# Patient Record
Sex: MALE | Race: OTHER | HISPANIC OR LATINO | Employment: FULL TIME | URBAN - METROPOLITAN AREA
[De-identification: names, ages, dates, MRNs, and addresses within clinical notes are randomized per-mention and may not be internally consistent; named-entity substitution may affect disease eponyms.]

---

## 2017-09-19 ENCOUNTER — HOSPITAL ENCOUNTER (EMERGENCY)
Facility: HOSPITAL | Age: 42
Discharge: HOME | End: 2017-09-19
Attending: EMERGENCY MEDICINE

## 2017-09-19 VITALS
WEIGHT: 265 LBS | HEART RATE: 92 BPM | BODY MASS INDEX: 35.94 KG/M2 | RESPIRATION RATE: 20 BRPM | DIASTOLIC BLOOD PRESSURE: 87 MMHG | SYSTOLIC BLOOD PRESSURE: 149 MMHG | OXYGEN SATURATION: 97 % | TEMPERATURE: 96.8 F

## 2017-09-19 DIAGNOSIS — B34.9 VIRAL SYNDROME: Primary | ICD-10-CM

## 2017-09-19 PROCEDURE — 99283 EMERGENCY DEPT VISIT LOW MDM: CPT

## 2017-09-19 RX ORDER — FLUTICASONE PROPIONATE 50 MCG
1 SPRAY, SUSPENSION (ML) NASAL DAILY
Qty: 16 G | Refills: 0 | Status: SHIPPED | OUTPATIENT
Start: 2017-09-19

## 2019-01-14 ENCOUNTER — APPOINTMENT (EMERGENCY)
Dept: RADIOLOGY | Facility: HOSPITAL | Age: 44
End: 2019-01-14
Payer: COMMERCIAL

## 2019-01-14 ENCOUNTER — HOSPITAL ENCOUNTER (EMERGENCY)
Facility: HOSPITAL | Age: 44
Discharge: LEFT BEFORE DISCHARGE | End: 2019-01-14
Attending: EMERGENCY MEDICINE
Payer: COMMERCIAL

## 2019-01-14 VITALS
BODY MASS INDEX: 35.94 KG/M2 | DIASTOLIC BLOOD PRESSURE: 81 MMHG | SYSTOLIC BLOOD PRESSURE: 143 MMHG | HEART RATE: 86 BPM | WEIGHT: 265 LBS | TEMPERATURE: 98.4 F | RESPIRATION RATE: 18 BRPM | OXYGEN SATURATION: 97 %

## 2019-01-14 DIAGNOSIS — G89.29 CHRONIC BACK PAIN: ICD-10-CM

## 2019-01-14 DIAGNOSIS — M54.9 CHRONIC BACK PAIN: ICD-10-CM

## 2019-01-14 DIAGNOSIS — M54.50 LOWER BACK PAIN: Primary | ICD-10-CM

## 2019-01-14 PROCEDURE — 96372 THER/PROPH/DIAG INJ SC/IM: CPT

## 2019-01-14 PROCEDURE — 72148 MRI LUMBAR SPINE W/O DYE: CPT

## 2019-01-14 PROCEDURE — 99284 EMERGENCY DEPT VISIT MOD MDM: CPT

## 2019-01-14 RX ORDER — METHOCARBAMOL 500 MG/1
500 TABLET, FILM COATED ORAL ONCE
Status: COMPLETED | OUTPATIENT
Start: 2019-01-14 | End: 2019-01-14

## 2019-01-14 RX ORDER — ACETAMINOPHEN 325 MG/1
975 TABLET ORAL ONCE
Status: COMPLETED | OUTPATIENT
Start: 2019-01-14 | End: 2019-01-14

## 2019-01-14 RX ORDER — IBUPROFEN 400 MG/1
400 TABLET ORAL EVERY 6 HOURS PRN
Qty: 30 TABLET | Refills: 0 | Status: SHIPPED | OUTPATIENT
Start: 2019-01-14

## 2019-01-14 RX ORDER — KETOROLAC TROMETHAMINE 30 MG/ML
15 INJECTION, SOLUTION INTRAMUSCULAR; INTRAVENOUS ONCE
Status: COMPLETED | OUTPATIENT
Start: 2019-01-14 | End: 2019-01-14

## 2019-01-14 RX ORDER — LIDOCAINE 50 MG/G
1 PATCH TOPICAL ONCE
Status: DISCONTINUED | OUTPATIENT
Start: 2019-01-14 | End: 2019-01-15 | Stop reason: HOSPADM

## 2019-01-14 RX ADMIN — METHOCARBAMOL 500 MG: 500 TABLET, FILM COATED ORAL at 20:21

## 2019-01-14 RX ADMIN — LIDOCAINE 1 PATCH: 50 PATCH CUTANEOUS at 20:44

## 2019-01-14 RX ADMIN — KETOROLAC TROMETHAMINE 15 MG: 30 INJECTION, SOLUTION INTRAMUSCULAR at 20:21

## 2019-01-14 RX ADMIN — ACETAMINOPHEN 975 MG: 325 TABLET, FILM COATED ORAL at 20:21

## 2019-01-15 NOTE — ED ATTENDING ATTESTATION
Marilou Hobbs MD, saw and evaluated the patient  I have discussed the patient with the resident/non-physician practitioner and agree with the resident's/non-physician practitioner's findings, Plan of Care, and MDM as documented in the resident's/non-physician practitioner's note, except where noted  All available labs and Radiology studies were reviewed  At this point I agree with the current assessment done in the Emergency Department  I have conducted an independent evaluation of this patient a history and physical is as follows:    Pt has 7 month history of low back pain and Pt lifts tires at work Pain is intermittent   Pt states that fr the past 2 months has had difficulty gettign to bathroom and is unable to hold his urine no fecal co Pt denies weakness or numbness pt states pain radiates into l buttock PE: alert nad heart reg lungs clear abd soft nontender tender lumbar sacral area motor5/5 sensation wnl dtrs 2+ MDM: due to urinary symptoms will check mri    Critical Care Time  CritCare Time    Procedures

## 2019-01-15 NOTE — DISCHARGE INSTRUCTIONS
Follow up with primary care doctor in 3-5 days  If your symptoms continue, or if there are ANY concerns,     Back Pain   WHAT YOU NEED TO KNOW:   Back pain is common  It can be caused by many conditions, such as arthritis or the breakdown of spinal discs  Your risk for back pain is increased by injuries, lack of activity, or repeated bending and twisting  You may feel sore or stiff on one or both sides of your back  The pain may spread to your buttocks or thighs  DISCHARGE INSTRUCTIONS:   Medicines:   · NSAIDs  help decrease swelling and pain  This medicine is available with or without a doctor's order  NSAIDs can cause stomach bleeding or kidney problems in certain people  If you take blood thinner medicine, always ask your healthcare provider if NSAIDs are safe for you  Always read the medicine label and follow directions  · Acetaminophen  decreases pain  It is available without a doctor's order  Ask how much to take and how often to take it  Follow directions  Acetaminophen can cause liver damage if not taken correctly  · Prescription pain medicine  may be given  Ask your healthcare provider how to take this medicine safely  · Take your medicine as directed  Contact your healthcare provider if you think your medicine is not helping or if you have side effects  Tell him or her if you are allergic to any medicine  Keep a list of the medicines, vitamins, and herbs you take  Include the amounts, and when and why you take them  Bring the list or the pill bottles to follow-up visits  Carry your medicine list with you in case of an emergency  Follow up with your healthcare provider in 2 weeks, or as directed:  Write down your questions so you remember to ask them during your visits  How to manage your back pain:   · Apply ice  on your back or affected area for 15 to 20 minutes every hour or as directed  Use an ice pack, or put crushed ice in a plastic bag  Cover it with a towel   Ice helps prevent tissue damage and decreases pain  · Apply heat  on your back or affected area for 20 to 30 minutes every 2 hours for as many days as directed  Heat helps decrease pain and muscle spasms  · Stay active  as much as you can without causing more pain  Bed rest could make your back pain worse  Avoid heavy lifting until your pain is gone  Return to the emergency department if:   · You have pain, numbness, or weakness in one or both legs  · Your pain becomes so severe that you cannot walk  · You cannot control your urine or bowel movements  · You have severe back pain with chest pain  · You have severe back pain, nausea, and vomiting  · You have severe back pain that spreads to your side or genital area  Contact your healthcare provider if:   · You have back pain that does not get better with rest and pain medicine  · You have a fever  · You have pain that worsens when you are on your back or when you rest     · You have pain that worsens when you cough or sneeze  · You lose weight without trying  · You have questions or concerns about your condition or care  © 2017 [facility] Information is for End User's use only and may not be sold, redistributed or otherwise used for commercial purposes  All illustrations and images included in CareNotes® are the copyrighted property of A D A M [employer]  or Cedrick Bhatti  The above information is an  only  It is not intended as medical advice for individual conditions or treatments  Talk to your doctor, nurse or pharmacist before following any medical regimen to see if it is safe and effective for you

## 2019-01-15 NOTE — ED PROVIDER NOTES
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  Patient states that the pain is intermittent may minimally better with ibuprofen the denies any radiation to his lower extremities  Denies any numbness or tingling  Patient does state that for the past couple months he has been having incontinence of his urine  He states that sometimes he is unable to hold his urine prior to the urination  Patient denies  denies any bowel complaints  Denies any IV drug abuse or trauma  Denies any fevers or recent illness patient states that he is able to the feel his perineum when he wipes after defecation  Otherwise denies any chest pain shortness of breath nausea vomiting abdominal pain  Prior to Admission Medications   Prescriptions Last Dose Informant Patient Reported? Taking?   dextromethorphan-guaifenesin (MUCINEX DM)  MG per 12 hr tablet   No No   Sig: Take 1 tablet by mouth every 12 (twelve) hours   fluticasone (FLONASE) 50 mcg/act nasal spray   No No   Si spray into each nostril daily      Facility-Administered Medications: None       History reviewed  No pertinent past medical history  History reviewed  No pertinent surgical history  History reviewed  No pertinent family history  I have reviewed and agree with the history as documented      Social History   Substance Use Topics    Smoking status: Current Every Day Smoker     Types: Cigarettes    Smokeless tobacco: Never Used      Comment: on weekends    Alcohol use Yes      Comment: occasional        Review of Systems   Constitutional: Negative for appetite change, chills, diaphoresis, fatigue and fever  HENT: Negative for congestion, ear discharge, ear pain, hearing loss, postnasal drip, rhinorrhea, sneezing and sore throat  Eyes: Negative for pain, discharge and redness  Respiratory: Negative for cough, choking, chest tightness, shortness of breath, wheezing and stridor  Cardiovascular: Negative for chest pain and palpitations  Gastrointestinal: Negative for abdominal distention, abdominal pain, blood in stool, constipation, diarrhea, nausea and vomiting  Genitourinary: Positive for urgency  Negative for decreased urine volume, difficulty urinating, dysuria, flank pain, frequency and hematuria  Urinary incontinence   Musculoskeletal: Positive for back pain  Negative for arthralgias, gait problem, joint swelling, myalgias, neck pain and neck stiffness  Skin: Negative for color change, pallor and rash  Allergic/Immunologic: Negative for environmental allergies, food allergies and immunocompromised state  Neurological: Negative for dizziness, seizures, weakness, light-headedness, numbness and headaches  Hematological: Negative for adenopathy  Does not bruise/bleed easily  Psychiatric/Behavioral: Negative for agitation and behavioral problems  Physical Exam  ED Triage Vitals [01/14/19 1824]   Temperature Pulse Respirations Blood Pressure SpO2   98 4 °F (36 9 °C) 100 18 (!) 166/102 96 %      Temp Source Heart Rate Source Patient Position - Orthostatic VS BP Location FiO2 (%)   Oral Monitor Sitting Left arm --      Pain Score       5           Orthostatic Vital Signs  Vitals:    01/14/19 1824 01/14/19 2144   BP: (!) 166/102 143/81   Pulse: 100 86   Patient Position - Orthostatic VS: Sitting Sitting       Physical Exam   Constitutional: He is oriented to person, place, and time  He appears well-developed and well-nourished  HENT:   Head: Normocephalic and atraumatic     Nose: Nose normal    Mouth/Throat: Oropharynx is clear and moist  Eyes: Pupils are equal, round, and reactive to light  Conjunctivae and EOM are normal    Neck: Normal range of motion  Neck supple  Cardiovascular: Normal rate, regular rhythm and normal heart sounds  Exam reveals no gallop and no friction rub  No murmur heard  Pulmonary/Chest: Effort normal and breath sounds normal  No respiratory distress  He has no wheezes  He has no rales  Abdominal: Soft  Bowel sounds are normal  He exhibits no distension  There is no tenderness  There is no rebound and no guarding  Musculoskeletal: Normal range of motion  Neurological: He is alert and oriented to person, place, and time  He has normal strength  No cranial nerve deficit or sensory deficit  He displays a negative Romberg sign  Skin: Skin is warm and dry  Psychiatric: He has a normal mood and affect  His behavior is normal    Nursing note and vitals reviewed  ED Medications  Medications   ketorolac (TORADOL) injection 15 mg (15 mg Intramuscular Given 1/14/19 2021)   acetaminophen (TYLENOL) tablet 975 mg (975 mg Oral Given 1/14/19 2021)   methocarbamol (ROBAXIN) tablet 500 mg (500 mg Oral Given 1/14/19 2021)       Diagnostic Studies  Results Reviewed     None                 MRI lumbar spine wo contrast   Final Result by Rafael Viera MD (01/14 2156)      Degenerative changes as described above  Workstation performed: [id number]               Procedures  Procedures      Phone Consults  ED Phone Contact    ED Course  ED Course as of Tyson 15 2249   Mon Jan 14, 2019   2040 Patient refused digital rectal exam    2212 Patient refusing urine sample and states that he needs to go home to see his kids                                MDM  Number of Diagnoses or Management Options  Chronic back pain:   Lower back pain:   Diagnosis management comments: 21-year-old male presents emerged department for evaluation of back pain      MDM:  Patient showing red flags concerning for cauda equina syndrome due to his urinary incontinence  I will order MRI to evaluate for this will treat patient symptomatically of total Tylenol Robaxin and Lidoderm patch  I will then reassess patient  MRI was negative for cauda equina sydrome  Patient feeling moderate back pain relief  Patient refused to give urine  Signed out AMA and understood and verbalized understanding risks of signing out AMA including death and permanent disability  CritCare Time    Disposition  Final diagnoses:   Lower back pain   Chronic back pain     Time reflects when diagnosis was documented in both MDM as applicable and the Disposition within this note     Time User Action Codes Description Comment    1/14/2019 10:17 PM Gianni Nolberto Add [M54 5] Lower back pain     1/14/2019 10:19 PM Gianni Nolberto Add [M54 9,  G89 29] Chronic back pain       ED Disposition     ED Disposition Condition Comment    AMA  Landon Tejeda discharge to home/self care  Condition at discharge: Good        Follow-up Information     Follow up With Specialties Details [facility]  In 3 days  [address] [address] [phone]          Discharge Medication List as of 1/14/2019 10:24 PM      START taking these medications    Details   ibuprofen (MOTRIN) 400 mg tablet Take 1 tablet (400 mg total) by mouth every 6 (six) hours as needed for mild pain, Starting Mon 1/14/2019, Print         CONTINUE these medications which have NOT CHANGED    Details   dextromethorphan-guaifenesin (MUCINEX DM)  MG per 12 hr tablet Take 1 tablet by mouth every 12 (twelve) hours, Starting Tue 9/19/2017, Print      fluticasone (FLONASE) 50 mcg/act nasal spray 1 spray into each nostril daily, Starting Tue 9/19/2017, Print           No discharge procedures on file  ED Provider  Attending physically available and evaluated Landon Hoffmankar  I managed the patient along with the ED Attending      Electronically Signed by Segundo Stern MD  01/15/19 3221

## 2019-07-16 ENCOUNTER — HOSPITAL ENCOUNTER (EMERGENCY)
Facility: HOSPITAL | Age: 44
Discharge: HOME | End: 2019-07-16
Attending: EMERGENCY MEDICINE
Payer: COMMERCIAL

## 2019-07-16 VITALS
WEIGHT: 260 LBS | HEIGHT: 71 IN | OXYGEN SATURATION: 98 % | TEMPERATURE: 98 F | RESPIRATION RATE: 20 BRPM | BODY MASS INDEX: 36.4 KG/M2 | SYSTOLIC BLOOD PRESSURE: 152 MMHG | DIASTOLIC BLOOD PRESSURE: 84 MMHG | HEART RATE: 102 BPM

## 2019-07-16 DIAGNOSIS — M77.8 SUBSCAPULARIS TENDINITIS, LEFT: ICD-10-CM

## 2019-07-16 DIAGNOSIS — M75.92 LEFT SUPRASPINATUS TENDINITIS: Primary | ICD-10-CM

## 2019-07-16 PROCEDURE — 99282 EMERGENCY DEPT VISIT SF MDM: CPT | Performed by: EMERGENCY MEDICINE

## 2019-07-16 PROCEDURE — 99283 EMERGENCY DEPT VISIT LOW MDM: CPT

## 2019-07-16 RX ORDER — IBUPROFEN 400 MG/1
400 TABLET ORAL ONCE
Status: COMPLETED | OUTPATIENT
Start: 2019-07-16 | End: 2019-07-16

## 2019-07-16 RX ADMIN — IBUPROFEN 400 MG: 400 TABLET ORAL at 21:32

## 2019-07-17 NOTE — ED PROVIDER NOTES
ASSESSMENT AND PLAN    Lexii Baldwin is a 44 y o  male who presents for evaluation of left, nontraumatic shoulder pain, possibly a work related injury to the patient performing a lot of heavy lifting at work  On arrival, the patient is hemodynamically stable and well-appearing without acute distress, with a nontoxic appearance  On exam , the patient displays tenderness to palpation over the supraspinatus, and subscapularis, which is exacerbated with internal rotation and abduction  Range of motion is full, and strength does not appear to be affected   The physical exam is otherwise unremarkable   -likely subscapularis and supraspinatus tendonitis  The patient was offered an x-ray, however he declined  The patient will follow up with occupational health  He was also provided with orthopedic follow-up should his symptoms are improve  Was offered Toradol for symptomatic relief, however he declined, and instead opted for ibuprofen  Recommend ibuprofen as an outpatient for pain as needed  History  Chief Complaint   Patient presents with    Shoulder Pain     pt reports left shoulder pain x 2 weekdenies injury but states 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  pt denies numbnees, tingling, or radiating pain     This is a 79-year-old male who presents for evaluation of nontraumatic left shoulder pain  The patient does a lot of lifting at work, 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  He states that since he started working the, he has developed left shoulder pain  He describes the pain as anterior, sharp, and generally only present with certain movements, stating that specifically abduction causes a lot of discomfort  He has not tried taking any medications  He states that he is starting to feel similar pain on his right shoulder, so he came to the emergency department for further evaluation  He denies any trauma    He denies any chest pain, shortness of breath, abdominal pain, nausea, vomiting, or diarrhea  He has no fevers  The patient has no history of orthopedic surgery  Prior to Admission Medications   Prescriptions Last Dose Informant Patient Reported? Taking?   dextromethorphan-guaifenesin (MUCINEX DM)  MG per 12 hr tablet   No No   Sig: Take 1 tablet by mouth every 12 (twelve) hours   fluticasone (FLONASE) 50 mcg/act nasal spray   No No   Si spray into each nostril daily   ibuprofen (MOTRIN) 400 mg tablet   No No   Sig: Take 1 tablet (400 mg total) by mouth every 6 (six) hours as needed for mild pain      Facility-Administered Medications: None       No past medical history on file  No past surgical history on file  No family history on file  I have reviewed and agree with the history as documented  Social History     Tobacco Use    Smoking status: Current Every Day Smoker     Types: Cigarettes    Smokeless tobacco: Never Used    Tobacco comment: on weekends   Substance Use Topics    Alcohol use: Yes     Comment: occasional    Drug use: No        Review of Systems   Constitutional: Negative for chills and fever  HENT: Negative for congestion and sinus pain  Eyes: Negative for photophobia and visual disturbance  Respiratory: Negative for cough and shortness of breath  Cardiovascular: Negative for chest pain and palpitations  Gastrointestinal: Negative for abdominal pain, diarrhea, nausea and vomiting  Genitourinary: Negative for dysuria and hematuria  Musculoskeletal: Negative for neck pain and neck stiffness  Skin: Negative for pallor and rash  Neurological: Negative for light-headedness and headaches         Physical Exam  ED Triage Vitals [19]   Temperature Pulse Respirations Blood Pressure SpO2   98 °F (36 7 °C) 102 20 152/84 98 %      Temp src Heart Rate Source Patient Position - Orthostatic VS BP Location FiO2 (%)   -- Monitor -- -- --      Pain Score       5             Orthostatic Vital Signs  Vitals:    19 BP: 152/84   Pulse: 102       Physical Exam   Constitutional: He is oriented to person, place, and time  Awake alert, well-appearing, no acute distress  Nontoxic  HENT:   Head: Normocephalic  Mouth/Throat: Oropharynx is clear and moist  No oropharyngeal exudate  Eyes: Pupils are equal, round, and reactive to light  EOM are normal  No scleral icterus  Neck: Normal range of motion  No JVD present  Cardiovascular: Normal rate and regular rhythm  Pulmonary/Chest: Effort normal  No respiratory distress  Abdominal: Soft  He exhibits no distension  Musculoskeletal:   Left shoulder is atraumatic  There is tenderness to palpation over the anterior aspect of the shoulder joint  There are no overlying skin changes, deformity, or crepitus  Pain is reproducible with internal rotation, and abduction, however range of motion is full  Neurological: He is alert and oriented to person, place, and time  No cranial nerve deficit  He exhibits normal muscle tone  Skin: Skin is warm and dry  No pallor  ED Medications  Medications   ibuprofen (MOTRIN) tablet 400 mg (has no administration in time range)       Diagnostic Studies  Results Reviewed     None                 No orders to display         Procedures  Procedures        ED Course                               MDM    Disposition  Final diagnoses:   None     ED Disposition     None      Follow-up Information    None         Patient's Medications   Discharge Prescriptions    No medications on file     No discharge procedures on file  ED Provider  Attending physically available and evaluated Jhoan Garrett I managed the patient along with the ED Attending      Electronically Signed by         Valerie Goldberg MD  07/17/19 9156

## 2019-07-17 NOTE — ED ATTENDING ATTESTATION
Celio Warner MD, saw and evaluated the patient  I have discussed the patient with the resident/non-physician practitioner and agree with the resident's/non-physician practitioner's findings, Plan of Care, and MDM as documented in the resident's/non-physician practitioner's note, except where noted  All available labs and Radiology studies were reviewed  I was present for key portions of any procedure(s) performed by the resident/non-physician practitioner and I was immediately available to provide assistance  At this point I agree with the current assessment done in the Emergency Department  I have conducted an independent evaluation of this patient a history and physical is as follows:    43 y/o m c/o L shoulder pain that has been ongoing x 2 weeks  Pt denies injury however states he does heavy lifting for work  Pain is worse when he does cross body movements  He has not tried medications or rest  No neck pain  No numbness/weakness/tingling  When specifically asked about his R shoulder he denies any pain  NO n/v  No cp/sob  PE, well developed m in NAD, VSS, NC/AT, MMM, neck supple/FROM, - midline ttp over C, T or L spine, RR/-murmurs, lungs CTAB, abd soft, NT/D, +BS, -r/g, + 2 radial pulses, capillary refill  < 2 sec, intact , intact sensation throughout left arm, FROM wrist, elbow and shoulder, pain with palpation over anterior aspect of shoulder, no erythema/warmth, FROM however pain reproduced on touch and internal/external rotation  R shoulder wnl on exam  A/p L shoulder pain, concern for msk, discussed imaing with patient, pt declining imaging  Discussed nonnarcotic pain control and f/u for MRI, PT and ortho   Pt states he will do tjhis    Critical Care Time  Procedures

## 2019-11-19 ENCOUNTER — APPOINTMENT (EMERGENCY)
Dept: RADIOLOGY | Facility: HOSPITAL | Age: 44
End: 2019-11-19

## 2019-11-19 ENCOUNTER — HOSPITAL ENCOUNTER (EMERGENCY)
Facility: HOSPITAL | Age: 44
Discharge: HOME | End: 2019-11-19
Attending: EMERGENCY MEDICINE

## 2019-11-19 VITALS
OXYGEN SATURATION: 97 % | DIASTOLIC BLOOD PRESSURE: 96 MMHG | RESPIRATION RATE: 20 BRPM | WEIGHT: 280 LBS | SYSTOLIC BLOOD PRESSURE: 202 MMHG | HEART RATE: 102 BPM | TEMPERATURE: 97.8 F | BODY MASS INDEX: 39.05 KG/M2

## 2019-11-19 DIAGNOSIS — M25.512 ACUTE PAIN OF LEFT SHOULDER: ICD-10-CM
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
## 2019-11-19 DIAGNOSIS — M54.2 NECK PAIN: ICD-10-CM

## 2019-11-19 DIAGNOSIS — R93.89 ABNORMAL CT SCAN: ICD-10-CM

## 2019-11-19 PROCEDURE — 99284 EMERGENCY DEPT VISIT MOD MDM: CPT

## 2019-11-19 PROCEDURE — 99284 EMERGENCY DEPT VISIT MOD MDM: CPT | Performed by: EMERGENCY MEDICINE

## 2019-11-19 PROCEDURE — 73030 X-RAY EXAM OF SHOULDER: CPT

## 2019-11-19 PROCEDURE — 72125 CT NECK SPINE W/O DYE: CPT

## 2019-11-19 RX ORDER — KETOROLAC TROMETHAMINE 30 MG/ML
15 INJECTION, SOLUTION INTRAMUSCULAR; INTRAVENOUS ONCE
Status: DISCONTINUED | OUTPATIENT
Start: 2019-11-19 | End: 2019-11-19

## 2019-11-19 RX ORDER — IBUPROFEN 400 MG/1
400 TABLET ORAL ONCE
Status: COMPLETED | OUTPATIENT
Start: 2019-11-19 | End: 2019-11-19

## 2019-11-19 RX ORDER — ACETAMINOPHEN 325 MG/1
650 TABLET ORAL ONCE
Status: COMPLETED | OUTPATIENT
Start: 2019-11-19 | End: 2019-11-19

## 2019-11-19 RX ADMIN — IBUPROFEN 400 MG: 400 TABLET ORAL at 19:13

## 2019-11-19 RX ADMIN — ACETAMINOPHEN 650 MG: 325 TABLET ORAL at 19:13
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
## 2019-11-20 NOTE — DISCHARGE INSTRUCTIONS
Follow-up with a primary care doctor regarding your high blood pressure and review findings of today's scan which shows some spinal stenosis, which is a chronic condition that can sometimes cause neck and back pain

## 2019-12-13 ENCOUNTER — HOSPITAL ENCOUNTER (EMERGENCY)
Facility: HOSPITAL | Age: 44
Discharge: HOME | End: 2019-12-13
Attending: EMERGENCY MEDICINE | Admitting: EMERGENCY MEDICINE

## 2019-12-13 VITALS
TEMPERATURE: 98 F | SYSTOLIC BLOOD PRESSURE: 135 MMHG | OXYGEN SATURATION: 98 % | RESPIRATION RATE: 16 BRPM | BODY MASS INDEX: 36.96 KG/M2 | HEART RATE: 72 BPM | WEIGHT: 265 LBS | DIASTOLIC BLOOD PRESSURE: 75 MMHG

## 2019-12-13 DIAGNOSIS — R51.9 NONINTRACTABLE HEADACHE, UNSPECIFIED CHRONICITY PATTERN, UNSPECIFIED HEADACHE TYPE: Primary | ICD-10-CM

## 2019-12-13 LAB
ANION GAP SERPL CALCULATED.3IONS-SCNC: 4 MMOL/L (ref 4–13)
BUN SERPL-MCNC: 15 MG/DL (ref 5–25)
CALCIUM SERPL-MCNC: 8.8 MG/DL (ref 8.3–10.1)
CHLORIDE SERPL-SCNC: 109 MMOL/L (ref 100–108)
CO2 SERPL-SCNC: 25 MMOL/L (ref 21–32)
CREAT SERPL-MCNC: 0.89 MG/DL (ref 0.6–1.3)
EST. AVERAGE GLUCOSE BLD GHB EST-MCNC: 120 MG/DL
GFR SERPL CREATININE-BSD FRML MDRD: 105 ML/MIN/1.73SQ M
GLUCOSE SERPL-MCNC: 98 MG/DL (ref 65–140)
HBA1C MFR BLD: 5.8 % (ref 4.2–6.3)
POTASSIUM SERPL-SCNC: 4.2 MMOL/L (ref 3.5–5.3)
SODIUM SERPL-SCNC: 138 MMOL/L (ref 136–145)

## 2019-12-13 PROCEDURE — 99283 EMERGENCY DEPT VISIT LOW MDM: CPT

## 2019-12-13 PROCEDURE — 80048 BASIC METABOLIC PNL TOTAL CA: CPT | Performed by: ORTHOPAEDIC SURGERY

## 2019-12-13 PROCEDURE — 96361 HYDRATE IV INFUSION ADD-ON: CPT

## 2019-12-13 PROCEDURE — 83036 HEMOGLOBIN GLYCOSYLATED A1C: CPT | Performed by: ORTHOPAEDIC SURGERY

## 2019-12-13 PROCEDURE — 96374 THER/PROPH/DIAG INJ IV PUSH: CPT

## 2019-12-13 PROCEDURE — 36415 COLL VENOUS BLD VENIPUNCTURE: CPT | Performed by: ORTHOPAEDIC SURGERY

## 2019-12-13 PROCEDURE — 99284 EMERGENCY DEPT VISIT MOD MDM: CPT | Performed by: EMERGENCY MEDICINE

## 2019-12-13 RX ORDER — KETOROLAC TROMETHAMINE 30 MG/ML
15 INJECTION, SOLUTION INTRAMUSCULAR; INTRAVENOUS ONCE
Status: COMPLETED | OUTPATIENT
Start: 2019-12-13 | End: 2019-12-13

## 2019-12-13 RX ORDER — SODIUM CHLORIDE, SODIUM LACTATE, POTASSIUM CHLORIDE, CALCIUM CHLORIDE 600; 310; 30; 20 MG/100ML; MG/100ML; MG/100ML; MG/100ML
500 INJECTION, SOLUTION INTRAVENOUS ONCE
Status: COMPLETED | OUTPATIENT
Start: 2019-12-13 | End: 2019-12-13

## 2019-12-13 RX ADMIN — SODIUM CHLORIDE, SODIUM LACTATE, POTASSIUM CHLORIDE, AND CALCIUM CHLORIDE 500 ML: .6; .31; .03; .02 INJECTION, SOLUTION INTRAVENOUS at 09:12

## 2019-12-13 RX ADMIN — KETOROLAC TROMETHAMINE 15 MG: 30 INJECTION, SOLUTION INTRAMUSCULAR at 09:13

## 2019-12-13 NOTE — ED PROVIDER NOTES
History  Chief Complaint   Patient presents with    Headache     HA for 3 days  "My pressure may be up"  38year old male with no significant PMH who presents with complaint of headaches x 3 days  The patient states that he has headaches in the morning with blurred vision that resolve spontaneously  He states that his blurred vision is associated with taking off his glasses  He has had no fevers, chills, abdominal pain, cough or congestion  He has had no other hearing or visual changes  Patient states that he has noted dry mouth, increased thirst and increased urinary frequency  He is concerned that he may have diabetes as he has a family history of diabetes in his mother and maternal aunt  Patient quit drinking and cocaine use 2 months ago, but states that he smokes 1 pack per day  Prior to Admission Medications   Prescriptions Last Dose Informant Patient Reported? Taking?   dextromethorphan-guaifenesin (MUCINEX DM)  MG per 12 hr tablet   No No   Sig: Take 1 tablet by mouth every 12 (twelve) hours   fluticasone (FLONASE) 50 mcg/act nasal spray   No No   Si spray into each nostril daily   ibuprofen (MOTRIN) 400 mg tablet   No No   Sig: Take 1 tablet (400 mg total) by mouth every 6 (six) hours as needed for mild pain      Facility-Administered Medications: None       History reviewed  No pertinent past medical history  History reviewed  No pertinent surgical history  History reviewed  No pertinent family history  I have reviewed and agree with the history as documented  Social History     Tobacco Use    Smoking status: Current Every Day Smoker     Types: Cigarettes    Smokeless tobacco: Never Used    Tobacco comment: on weekends   Substance Use Topics    Alcohol use: Not Currently    Drug use: No        Review of Systems   Constitutional: Negative for chills, diaphoresis and fever     HENT: Negative for congestion, ear pain, hearing loss, rhinorrhea, sinus pain and sore throat  Eyes: Positive for visual disturbance  Negative for pain  Patient reports blurred vision     Respiratory: Negative for shortness of breath  Cardiovascular: Negative for chest pain  Gastrointestinal: Negative for abdominal distention, abdominal pain, constipation, diarrhea, nausea and vomiting  Endocrine: Positive for polydipsia and polyuria  Genitourinary: Positive for frequency  Negative for dysuria  Musculoskeletal: Negative for back pain and neck pain  Skin: Negative for rash  Neurological: Positive for headaches  Negative for weakness, light-headedness and numbness  Hematological: Negative for adenopathy  Psychiatric/Behavioral: Negative for confusion  Physical Exam  ED Triage Vitals   Temperature Pulse Respirations Blood Pressure SpO2   12/13/19 0828 12/13/19 0828 12/13/19 0828 12/13/19 0828 12/13/19 0828   98 °F (36 7 °C) 80 16 (!) 130/101 98 %      Temp Source Heart Rate Source Patient Position - Orthostatic VS BP Location FiO2 (%)   12/13/19 0828 12/13/19 0952 12/13/19 0952 12/13/19 0952 --   Oral Monitor Lying Right arm       Pain Score       12/13/19 0828       7             Orthostatic Vital Signs  Vitals:    12/13/19 0828 12/13/19 0952   BP: (!) 130/101 135/75   Pulse: 80 72   Patient Position - Orthostatic VS:  Lying       Physical Exam   Constitutional: He is oriented to person, place, and time  He appears well-nourished  No distress  Obese male in no acute distress     HENT:   Head: Normocephalic  Right Ear: External ear normal    Left Ear: External ear normal    Nose: Nose normal    Mouth/Throat: Oropharynx is clear and moist    Eyes: Pupils are equal, round, and reactive to light  Conjunctivae and EOM are normal    Neck: Normal range of motion  Neck supple  No thyromegaly present  Cardiovascular: Normal rate and intact distal pulses  Pulmonary/Chest: Effort normal    Abdominal: Soft  He exhibits no distension  There is no tenderness  Musculoskeletal: Normal range of motion  Lymphadenopathy:     He has no cervical adenopathy  Neurological: He is alert and oriented to person, place, and time  No sensory deficit  Skin: No rash noted  Psychiatric: He has a normal mood and affect  Nursing note and vitals reviewed  ED Medications  Medications   lactated ringers infusion 500 mL (0 mL Intravenous Stopped 12/13/19 0952)   ketorolac (TORADOL) injection 15 mg (15 mg Intravenous Given 12/13/19 0913)       Diagnostic Studies  Results Reviewed     Procedure Component Value Units Date/Time    Basic metabolic panel [[id number]]  (Abnormal) Collected:  12/13/19 0913    Lab Status:  Final result Specimen:  Blood from Arm, Left Updated:  12/13/19 0942     Sodium 138 mmol/L      Potassium 4 2 mmol/L      Chloride 109 mmol/L      CO2 25 mmol/L      ANION GAP 4 mmol/L      BUN 15 mg/dL      Creatinine 0 89 mg/dL      Glucose 98 mg/dL      Calcium 8 8 mg/dL      eGFR 105 ml/min/1 73sq m     Narrative:       [place] guidelines for Chronic Kidney Disease (CKD):     Stage 1 with normal or high GFR (GFR > 90 mL/min/1 73 square meters)    Stage 2 Mild CKD (GFR = 60-89 mL/min/1 73 square meters)    Stage 3A Moderate CKD (GFR = 45-59 mL/min/1 73 square meters)    Stage 3B Moderate CKD (GFR = 30-44 mL/min/1 73 square meters)    Stage 4 Severe CKD (GFR = 15-29 mL/min/1 73 square meters)    Stage 5 End Stage CKD (GFR <15 mL/min/1 73 square meters)  Note: GFR calculation is accurate only with a steady state creatinine    Hemoglobin A1C [[id number]] Collected:  12/13/19 0913    Lab Status:   In process Specimen:  Blood from Arm, Left Updated:  12/13/19 0921                 No orders to display         Procedures  Procedures      ED Course                               MDM  Number of Diagnoses or Management Options  Nonintractable headache, unspecified chronicity pattern, unspecified headache type:   Diagnosis management comments: 38 year old male with 3 days of headaches without alarm symptoms  Patient's headaches likely related to decreased PO fluid intake  Patient's BMP is within normal limits  The importance of establishing a PCP was discussed with the patient  Hgb A1C was drawn today with instructions given to follow up with the [facility] in 1 week  - Discharge home with outpatient follow up       Amount and/or Complexity of Data Reviewed  Clinical lab tests: ordered and reviewed    Risk of Complications, Morbidity, and/or Mortality  Presenting problems: minimal  Diagnostic procedures: minimal  Management options: minimal    Patient Progress  Patient progress: stable        Disposition  Final diagnoses:   Nonintractable headache, unspecified chronicity pattern, unspecified headache type     Time reflects when diagnosis was documented in both MDM as applicable and the Disposition within this note     Time User Action Codes Description Comment    12/13/2019  9:49 AM Michael Robison Add [R51] Nonintractable headache, unspecified chronicity pattern, unspecified headache type       ED Disposition     ED Disposition Condition Date/Time Comment    Discharge Stable Fri Dec 13, 2019  9:48 AM Amber Mathews discharge to home/self care  Follow-up Information     Follow up With Specialties Details Why [address] [facility]  In 1 week For establishing care and follow up for [facility] [phone]          Patient's Medications   Discharge Prescriptions    No medications on file     No discharge procedures on file  ED Provider  Attending physically available and evaluated Amber Mathews  I managed the patient along with the ED Attending      Electronically Signed by         Mary Beth Tom MD  12/13/19 4404

## 2019-12-13 NOTE — DISCHARGE INSTRUCTIONS
You may take tylenol or ibuprofen as needed for headaches   Please follow up with the [facility]

## 2019-12-30 ENCOUNTER — HOSPITAL ENCOUNTER (EMERGENCY)
Facility: HOSPITAL | Age: 44
Discharge: HOME | End: 2019-12-30
Attending: EMERGENCY MEDICINE

## 2019-12-30 VITALS
DIASTOLIC BLOOD PRESSURE: 83 MMHG | HEART RATE: 89 BPM | RESPIRATION RATE: 18 BRPM | WEIGHT: 275 LBS | TEMPERATURE: 98.3 F | OXYGEN SATURATION: 98 % | BODY MASS INDEX: 37.25 KG/M2 | HEIGHT: 72 IN | SYSTOLIC BLOOD PRESSURE: 137 MMHG

## 2019-12-30 DIAGNOSIS — J06.9 URI (UPPER RESPIRATORY INFECTION): Primary | ICD-10-CM

## 2019-12-30 DIAGNOSIS — J02.9 SORE THROAT: ICD-10-CM

## 2019-12-30 PROCEDURE — 99282 EMERGENCY DEPT VISIT SF MDM: CPT

## 2019-12-30 PROCEDURE — 99283 EMERGENCY DEPT VISIT LOW MDM: CPT | Performed by: EMERGENCY MEDICINE

## 2019-12-30 RX ORDER — IBUPROFEN 600 MG/1
600 TABLET ORAL EVERY 8 HOURS PRN
Qty: 30 TABLET | Refills: 0 | Status: SHIPPED | OUTPATIENT
Start: 2019-12-30

## 2019-12-30 RX ORDER — IBUPROFEN 600 MG/1
600 TABLET ORAL ONCE
Status: COMPLETED | OUTPATIENT
Start: 2019-12-30 | End: 2019-12-30

## 2019-12-30 RX ADMIN — IBUPROFEN 600 MG: 600 TABLET ORAL at 04:01

## 2019-12-30 NOTE — ED ATTENDING ATTESTATION
12/30/2019  Estrellita Canada DO, saw and evaluated the patient  I have discussed the patient with the resident/non-physician practitioner and agree with the resident's/non-physician practitioner's findings, Plan of Care, and MDM as documented in the resident's/non-physician practitioner's note, except where noted  All available labs and Radiology studies were reviewed  I was present for key portions of any procedure(s) performed by the resident/non-physician practitioner and I was immediately available to provide assistance  At this point I agree with the current assessment done in the Emergency Department  I have conducted an independent evaluation of this patient a history and physical is as follows:      39 yo male c/o 2h of sore throat, L ear pain  Constant since onset, no a/e factors  Non radiating  Denies fever, HA, neck pain/stiffness, CP/SOB, abd pain, n/v, rash  POP mild erythema, uvula midline  No tonsillar enlargement or exudates  Neck supple no meningismus  L TM unremarkable  Imp: sore throat, ear pain  Plan: symptomatic tx          ED Course         Critical Care Time  Procedures

## 2019-12-30 NOTE — ED PROVIDER NOTES
History  Chief Complaint   Patient presents with    Sore Throat     left ear ache, sore throat, started last night    Earache     39 yo M without relevant PMH presenting for left sided earache and sore throat for the past 2-3 hours with concern that he needs antibiotics for an ear infection  Hasn't had any fevers, chills, night sweats, CP, SOB, cough, GI symptoms, myalgias  Has been around his cousins who have had a URI  Hasn't noted any drainage from his ear, no foreign body reported  ROS otherwise neg as below  History provided by:  Patient   used: No        Prior to Admission Medications   Prescriptions Last Dose Informant Patient Reported? Taking?   dextromethorphan-guaifenesin (MUCINEX DM)  MG per 12 hr tablet   No Yes   Sig: Take 1 tablet by mouth every 12 (twelve) hours   fluticasone (FLONASE) 50 mcg/act nasal spray   No Yes   Si spray into each nostril daily   ibuprofen (MOTRIN) 400 mg tablet   No Yes   Sig: Take 1 tablet (400 mg total) by mouth every 6 (six) hours as needed for mild pain      Facility-Administered Medications: None       History reviewed  No pertinent past medical history  History reviewed  No pertinent surgical history  History reviewed  No pertinent family history  I have reviewed and agree with the history as documented  Social History     Tobacco Use    Smoking status: Current Every Day Smoker     Types: Cigarettes    Smokeless tobacco: Never Used    Tobacco comment: on weekends   Substance Use Topics    Alcohol use: Not Currently    Drug use: No        Review of Systems   Constitutional: Negative for appetite change, chills, diaphoresis, fatigue and fever  HENT: Positive for ear pain and sore throat  Negative for ear discharge, facial swelling, hearing loss, rhinorrhea, trouble swallowing and voice change  Eyes: Negative for photophobia, pain, redness and visual disturbance  Respiratory: Negative for cough and wheezing  Cardiovascular: Negative for chest pain  Gastrointestinal: Negative for abdominal pain, diarrhea, nausea and vomiting  Genitourinary: Negative for dysuria and hematuria  Musculoskeletal: Negative for arthralgias and myalgias  Skin: Negative for pallor and rash  Allergic/Immunologic: Negative for immunocompromised state  Neurological: Negative for dizziness, light-headedness and headaches  Psychiatric/Behavioral: Negative for confusion  The patient is not nervous/anxious  Physical Exam  ED Triage Vitals [12/30/19 0342]   Temperature Pulse Respirations Blood Pressure SpO2   98 3 °F (36 8 °C) 89 18 137/83 98 %      Temp src Heart Rate Source Patient Position - Orthostatic VS BP Location FiO2 (%)   -- -- -- -- --      Pain Score       7             Orthostatic Vital Signs  Vitals:    12/30/19 0342   BP: 137/83   Pulse: 89       Physical Exam   Constitutional: He appears well-developed and well-nourished  No distress  HENT:   Head: Normocephalic and atraumatic  Right Ear: Hearing, tympanic membrane and ear canal normal  No drainage, swelling or tenderness  No middle ear effusion  Left Ear: Hearing, tympanic membrane and ear canal normal  No drainage, swelling or tenderness  No middle ear effusion  Nose: Nose normal    Mouth/Throat: Uvula is midline and mucous membranes are normal  No oropharyngeal exudate  Mildly erythematous oropharynx without exudates  Eyes: Pupils are equal, round, and reactive to light  Conjunctivae are normal  Right eye exhibits no discharge  Left eye exhibits no discharge  Neck: Normal range of motion  Cardiovascular: Normal rate and regular rhythm  No murmur heard  Pulmonary/Chest: Effort normal and breath sounds normal  No respiratory distress  He has no wheezes  Abdominal: Soft  Bowel sounds are normal  He exhibits no distension  Musculoskeletal: Normal range of motion  He exhibits no edema or deformity  Neurological: He is alert     Skin: Skin is warm and dry  No rash noted  He is not diaphoretic  No pallor  Psychiatric: He has a normal mood and affect  Nursing note and vitals reviewed  ED Medications  Medications   ibuprofen (MOTRIN) tablet 600 mg (600 mg Oral Given 12/30/19 0401)       Diagnostic Studies  Results Reviewed     None                 No orders to display         Procedures  Procedures      ED Course                               MDM  Number of Diagnoses or Management Options  Sore throat:   URI (upper respiratory infection):   Diagnosis management comments: 39 yo M presenting for a few hours of sore throat and left ear pain without signs of infection at left ear, no mastoid TTP, to tenderness with manipulation of pinna  Oropharynx normal except for slight erythema  No indication for antibiotics at this time  Recommended supportive care with ibuprofen for pain  Discussed f/u with PCP and/or return to the ER if symptoms do not improve or start to significantly worsen within the next 48-72 hours  Discharged to home in good condition  Disposition  Final diagnoses:   URI (upper respiratory infection)   Sore throat     Time reflects when diagnosis was documented in both MDM as applicable and the Disposition within this note     Time User Action Codes Description Comment    12/30/2019  3:56 AM Ron Rosas Add [J06 9] URI (upper respiratory infection)     12/30/2019  3:56 AM Ron Rosas Add [J02 9] Sore throat       ED Disposition     ED Disposition Condition Date/Time Comment    Discharge Good Mon Dec 30, 2019  3:57 AM Cuco Ahr discharge to home/self care              Follow-up Information     Follow up With Specialties Details Why Contact Info Additional [address] Emergency Department Emergency Medicine Go to  As needed [address]  [phone]  ED, [address] [place] [address], [address] [place] [address] Internal Medicine Schedule an appointment as soon as possible for a visit in 1 day For reevaluation as we discussed  [id number] [facility] [id number] [address] [facility], [address] [place] [address], [id number] [phone]          Discharge Medication List as of 12/30/2019  4:00 AM      START taking these medications    Details   !! ibuprofen (MOTRIN) 600 mg tablet Take 1 tablet (600 mg total) by mouth every 8 (eight) hours as needed for mild pain or moderate pain, Starting Mon 12/30/2019, Print       !! - Potential duplicate medications found  Please discuss with provider  CONTINUE these medications which have NOT CHANGED    Details   dextromethorphan-guaifenesin (MUCINEX DM)  MG per 12 hr tablet Take 1 tablet by mouth every 12 (twelve) hours, Starting Tue 9/19/2017, Print      fluticasone (FLONASE) 50 mcg/act nasal spray 1 spray into each nostril daily, Starting Tue 9/19/2017, Print      !! ibuprofen (MOTRIN) 400 mg tablet Take 1 tablet (400 mg total) by mouth every 6 (six) hours as needed for mild pain, Starting Mon 1/14/2019, Print       !! - Potential duplicate medications found  Please discuss with provider  No discharge procedures on file  ED Provider  Attending physically available and evaluated Damon Kingsley  ILAN managed the patient along with the ED Attending      Electronically Signed by         Shania Ceballos MD  12/30/19 2020

## 2019-12-30 NOTE — DISCHARGE INSTRUCTIONS
Please follow up with our primary care clinic this week for repeat evaluation   You should use ibuprofen

## 2020-01-14 ENCOUNTER — APPOINTMENT (EMERGENCY)
Dept: RADIOLOGY | Facility: HOSPITAL | Age: 45
End: 2020-01-14
Payer: COMMERCIAL

## 2020-01-14 ENCOUNTER — HOSPITAL ENCOUNTER (EMERGENCY)
Facility: HOSPITAL | Age: 45
Discharge: HOME | End: 2020-01-14
Attending: EMERGENCY MEDICINE | Admitting: EMERGENCY MEDICINE
Payer: COMMERCIAL

## 2020-01-14 VITALS
SYSTOLIC BLOOD PRESSURE: 139 MMHG | TEMPERATURE: 98.7 F | BODY MASS INDEX: 38.51 KG/M2 | RESPIRATION RATE: 20 BRPM | HEART RATE: 107 BPM | DIASTOLIC BLOOD PRESSURE: 72 MMHG | WEIGHT: 280 LBS | OXYGEN SATURATION: 94 %

## 2020-01-14 DIAGNOSIS — J20.9 BRONCHITIS, ACUTE: Primary | ICD-10-CM

## 2020-01-14 PROCEDURE — 71046 X-RAY EXAM CHEST 2 VIEWS: CPT

## 2020-01-14 PROCEDURE — 99284 EMERGENCY DEPT VISIT MOD MDM: CPT | Performed by: EMERGENCY MEDICINE

## 2020-01-14 PROCEDURE — 99283 EMERGENCY DEPT VISIT LOW MDM: CPT

## 2020-01-14 RX ORDER — NICOTINE 21 MG/24HR
1 PATCH, TRANSDERMAL 24 HOURS TRANSDERMAL EVERY 24 HOURS
COMMUNITY
Start: 2019-12-17

## 2020-01-14 RX ORDER — OXYMETAZOLINE HYDROCHLORIDE 0.05 G/100ML
2 SPRAY NASAL ONCE
Status: COMPLETED | OUTPATIENT
Start: 2020-01-14 | End: 2020-01-14

## 2020-01-14 RX ORDER — OXYMETAZOLINE HYDROCHLORIDE 0.05 G/100ML
2 SPRAY NASAL 2 TIMES DAILY
Qty: 30 ML | Refills: 0 | Status: SHIPPED | OUTPATIENT
Start: 2020-01-14

## 2020-01-14 RX ORDER — AZITHROMYCIN 250 MG/1
TABLET, FILM COATED ORAL
Qty: 6 TABLET | Refills: 0 | Status: SHIPPED | OUTPATIENT
Start: 2020-01-14 | End: 2020-01-18

## 2020-01-14 RX ORDER — CYCLOBENZAPRINE HCL 10 MG
10 TABLET ORAL DAILY PRN
COMMUNITY
Start: 2019-12-17

## 2020-01-14 RX ADMIN — OXYMETAZOLINE HYDROCHLORIDE 2 SPRAY: 0.05 SPRAY NASAL at 20:10

## 2020-01-15 NOTE — ED NOTES
Pt states he is an unemployed   Pt denies recent trauma to chest   Pt denies any tylenol or motrin today       Dimitri Cobb RN  01/14/20 2006

## 2020-01-15 NOTE — ED PROVIDER NOTES
History  Chief Complaint   Patient presents with    Cough     per pt "he has been having some productive cough for about 2wks now with some grenish sputum  "     59-year-old male presents with chief complaint of nasal and sinus congestion coupled with a cough productive of a yellow greenish sputum  Patient reports onset was 2 weeks ago and it has just been lingering  No fevers or chills  No nausea or vomiting  No shortness of breath  Patient is a current everyday smoker  History provided by:  Patient   used: No    Cough   Cough characteristics:  Productive  Sputum characteristics:  Green and yellow  Severity:  Moderate  Onset quality:  Gradual  Duration:  2 weeks  Timing:  Constant  Progression:  Unchanged  Chronicity:  New  Smoker: yes    Context: upper respiratory infection    Relieved by:  Nothing  Worsened by:  Nothing  Ineffective treatments:  None tried  Associated symptoms: sinus congestion    Associated symptoms: no chest pain, no chills, no diaphoresis, no fever, no rash, no shortness of breath, no sore throat and no wheezing        Prior to Admission Medications   Prescriptions Last Dose Informant Patient Reported? Taking?    cyclobenzaprine (FLEXERIL) 10 mg tablet 2020 at Unknown time  Yes Yes   Sig: Take 10 mg by mouth daily as needed   dextromethorphan-guaifenesin (MUCINEX DM)  MG per 12 hr tablet Not Taking at Unknown time  No No   Sig: Take 1 tablet by mouth every 12 (twelve) hours   Patient not taking: Reported on 2020   fluticasone (FLONASE) 50 mcg/act nasal spray Not Taking at Unknown time  No No   Si spray into each nostril daily   Patient not taking: Reported on 2020   ibuprofen (MOTRIN) 400 mg tablet Not Taking at Unknown time  No No   Sig: Take 1 tablet (400 mg total) by mouth every 6 (six) hours as needed for mild pain   Patient not taking: Reported on 2020   ibuprofen (MOTRIN) 600 mg tablet Not Taking at Unknown time  No No   Sig: Take 1 tablet (600 mg total) by mouth every 8 (eight) hours as needed for mild pain or moderate pain   Patient not taking: Reported on 1/14/2020   nicotine (NICODERM CQ) 14 mg/24hr TD 24 hr patch 1/14/2020 at Unknown time  Yes Yes   Sig: Place 1 patch on the skin every 24 hours   nicotine polacrilex (NICORETTE) 4 mg gum 1/14/2020 at Unknown time  Yes Yes   Sig: Chew 4 mg 4 (four) times a day as needed      Facility-Administered Medications: None       History reviewed  No pertinent past medical history  History reviewed  No pertinent surgical history  History reviewed  No pertinent family history  I have reviewed and agree with the history as documented  Social History     Tobacco Use    Smoking status: Current Every Day Smoker     Types: Cigarettes    Smokeless tobacco: Never Used    Tobacco comment: on weekends   Substance Use Topics    Alcohol use: Not Currently    Drug use: No        Review of Systems   Constitutional: Negative for chills, diaphoresis and fever  HENT: Positive for sinus pressure and sinus pain  Negative for sore throat  Respiratory: Positive for cough  Negative for shortness of breath and wheezing  Cardiovascular: Negative for chest pain and palpitations  Gastrointestinal: Negative for diarrhea, nausea and vomiting  Genitourinary: Negative for dysuria and frequency  Skin: Negative for rash  All other systems reviewed and are negative  Physical Exam  Physical Exam   Constitutional: He is oriented to person, place, and time  He appears well-developed and well-nourished  He appears distressed (Mild)  HENT:   Head: Normocephalic and atraumatic  Eyes: Pupils are equal, round, and reactive to light  EOM are normal    Neck: Normal range of motion  No JVD present  Cardiovascular: Normal rate, regular rhythm, normal heart sounds and intact distal pulses  Exam reveals no gallop and no friction rub  No murmur heard    Pulmonary/Chest: Effort normal and breath sounds normal  No respiratory distress  He has no wheezes  He has no rales  He exhibits no tenderness  Musculoskeletal: Normal range of motion  He exhibits no tenderness  Neurological: He is alert and oriented to person, place, and time  Skin: Skin is warm and dry  Psychiatric: He has a normal mood and affect  His behavior is normal  Judgment and thought content normal    Nursing note and vitals reviewed  Vital Signs  ED Triage Vitals [01/14/20 1937]   Temperature Pulse Respirations Blood Pressure SpO2   98 7 °F (37 1 °C) (!) 107 20 139/72 94 %      Temp Source Heart Rate Source Patient Position - Orthostatic VS BP Location FiO2 (%)   Oral Monitor Sitting Left arm --      Pain Score       --           Vitals:    01/14/20 1937   BP: 139/72   Pulse: (!) 107   Patient Position - Orthostatic VS: Sitting         Visual Acuity      ED Medications  Medications   oxymetazoline (AFRIN) 0 05 % nasal spray 2 spray (has no administration in time range)       Diagnostic Studies  Results Reviewed     None                 XR chest 2 views   ED Interpretation by Axel Cartagena MD (01/14 1957)   This film was interpreted independently by me  No infiltrate, cardiac silhouette normal, no pleural effusions or pulmonary edema                    Procedures  Procedures         ED Course                               MDM  Number of Diagnoses or Management Options  Bronchitis, acute: new and requires workup  Diagnosis management comments: Background: 38 y o  male presents with cough, sinus congestion and pressure    Differential DX includes but is not limited to: bronchitis, sinusitis     Plan: chest xray, symptomatic treatment          Amount and/or Complexity of Data Reviewed  Tests in the radiology section of CPT®: ordered and reviewed    Patient Progress  Patient progress: stable        Disposition  Final diagnoses:   Bronchitis, acute     Time reflects when diagnosis was documented in both MDM as applicable and the Disposition within this note     Time User Action Codes Description Comment    1/14/2020  8:04 PM Rhina HAY Add [J41 1] Chronic bronchitis with productive mucopurulent cough ([address] )     1/14/2020  8:04 PM Rhina HAY Remove [J41 1] Chronic bronchitis with productive mucopurulent cough ([address] )     1/14/2020  8:04 PM Dwayne Songight Add [J20 9] Bronchitis, acute       ED Disposition     ED Disposition Condition Date/Time Comment    Discharge Stable Tue Jan 14, 2020  8:04 PM Abdi Carrasco discharge to home/self care  Follow-up Information    None         Patient's Medications   Discharge Prescriptions    AZITHROMYCIN (ZITHROMAX) 250 MG TABLET    Take first 2 tablets together on day one, then 1 tablet every day thereafter until finished  Start Date: 1/14/2020 End Date: 1/18/2020       Order Dose: --       Quantity: 6 tablet    Refills: 0    OXYMETAZOLINE (AFRIN) 0 05 % NASAL SPRAY    2 sprays by Each Nare route 2 (two) times a day For 3 days only to avoid rebound congestion  Start Date: 1/14/2020 End Date: --       Order Dose: 2 sprays       Quantity: 30 mL    Refills: 0     No discharge procedures on file      ED Provider  Electronically Signed by           Carolin Lujan MD  01/14/20 2005

## 2020-01-24 ENCOUNTER — HOSPITAL ENCOUNTER (EMERGENCY)
Facility: HOSPITAL | Age: 45
Discharge: HOME | End: 2020-01-24
Attending: EMERGENCY MEDICINE
Payer: COMMERCIAL

## 2020-01-24 VITALS
RESPIRATION RATE: 18 BRPM | OXYGEN SATURATION: 96 % | TEMPERATURE: 97.9 F | SYSTOLIC BLOOD PRESSURE: 150 MMHG | DIASTOLIC BLOOD PRESSURE: 95 MMHG | BODY MASS INDEX: 37.82 KG/M2 | HEART RATE: 77 BPM | WEIGHT: 275 LBS

## 2020-01-24 DIAGNOSIS — H92.02 LEFT EAR PAIN: Primary | ICD-10-CM

## 2020-01-24 PROCEDURE — 99284 EMERGENCY DEPT VISIT MOD MDM: CPT | Performed by: EMERGENCY MEDICINE

## 2020-01-24 PROCEDURE — 99283 EMERGENCY DEPT VISIT LOW MDM: CPT

## 2020-01-24 RX ORDER — IBUPROFEN 600 MG/1
600 TABLET ORAL ONCE
Status: COMPLETED | OUTPATIENT
Start: 2020-01-24 | End: 2020-01-24

## 2020-01-24 RX ORDER — LORATADINE 10 MG/1
10 TABLET ORAL DAILY
Qty: 20 TABLET | Refills: 0 | Status: SHIPPED | OUTPATIENT
Start: 2020-01-24

## 2020-01-24 RX ORDER — IBUPROFEN 600 MG/1
600 TABLET ORAL EVERY 6 HOURS PRN
Qty: 30 TABLET | Refills: 0 | Status: SHIPPED | OUTPATIENT
Start: 2020-01-24

## 2020-01-24 RX ADMIN — IBUPROFEN 600 MG: 600 TABLET ORAL at 03:56

## 2020-01-24 NOTE — ED PROVIDER NOTES
History  Chief Complaint   Patient presents with   [place]     left ear pain for 1 day, mild sore throat, no fever, no cough     HPI this is a 25-year-old male who presents to the emergency department with left ear pain  Patient woke up to use the restroom during the night, and noted that he had left ear pain  The patient did not take anything for the pain  He also reports a mild sore throat  Patient states that numerous people in the place where he is staying are sick  The patient denies fever, cough, chest pain, or shortness of breath  He does not have neck swelling  He does not have change in his hearing  Describes the pain as an aching sensation which has been constant since he noted it  Prior to Admission Medications   Prescriptions Last Dose Informant Patient Reported? Taking?    cyclobenzaprine (FLEXERIL) 10 mg tablet Not Taking at Unknown time  Yes No   Sig: Take 10 mg by mouth daily as needed   dextromethorphan-guaifenesin (MUCINEX DM)  MG per 12 hr tablet   No No   Sig: Take 1 tablet by mouth every 12 (twelve) hours   Patient not taking: Reported on 2020   fluticasone (FLONASE) 50 mcg/act nasal spray   No No   Si spray into each nostril daily   Patient not taking: Reported on 2020   ibuprofen (MOTRIN) 400 mg tablet   No No   Sig: Take 1 tablet (400 mg total) by mouth every 6 (six) hours as needed for mild pain   Patient not taking: Reported on 2020   ibuprofen (MOTRIN) 600 mg tablet   No No   Sig: Take 1 tablet (600 mg total) by mouth every 8 (eight) hours as needed for mild pain or moderate pain   Patient not taking: Reported on 2020   nicotine (NICODERM CQ) 14 mg/24hr TD 24 hr patch   Yes No   Sig: Place 1 patch on the skin every 24 hours   nicotine polacrilex (NICORETTE) 4 mg gum   Yes No   Sig: Chew 4 mg 4 (four) times a day as needed   oxymetazoline (AFRIN) 0 05 % nasal spray   No Yes   Si sprays by Each Nare route 2 (two) times a day For 3 days only to avoid rebound congestion  Facility-Administered Medications: None       History reviewed  No pertinent past medical history  History reviewed  No pertinent surgical history  History reviewed  No pertinent family history  I have reviewed and agree with the history as documented  Social History     Tobacco Use    Smoking status: Current Every Day Smoker     Types: Cigarettes    Smokeless tobacco: Never Used    Tobacco comment: on weekends   Substance Use Topics    Alcohol use: Not Currently    Drug use: No        Review of Systems  No chest pain, abdominal pain, nausea, vomiting, fevers, chills, shortness of breath  Review of systems negative in 12 systems reviewed  Physical Exam  Physical Exam    Vital Signs  ED Triage Vitals [01/24/20 0313]   Temperature Pulse Respirations Blood Pressure SpO2   97 9 °F (36 6 °C) 77 18 150/95 96 %      Temp Source Heart Rate Source Patient Position - Orthostatic VS BP Location FiO2 (%)   Tympanic Monitor Sitting Left arm --      Pain Score       5           Vitals:    01/24/20 0313   BP: 150/95   Pulse: 77   Patient Position - Orthostatic VS: Sitting       On exam the patient is awake, alert, interactive  Vital signs are normal   Pupils are round reactive to light  Extraocular movements are intact  His face is symmetric  His oropharynx is clear with slightly injected tonsils  Has no significant nasal congestion  TMs are normal bilaterally  He does not have significant impacted cerumen  His neck is supple and nontender  There is no adenopathy  He has full and normal jaw excursion  The patient's heart is regular without murmurs, rubs, gallops  Lungs are clear bilaterally with good air movement  Abdomen is soft and nontender with no masses, rebound, guarding  Extremities are warm well perfused  He has good pulses throughout    Patient is neurologically intact with normal back in skin exam   Visual Acuity      ED Medications  Medications - No data to display    Diagnostic Studies  Results Reviewed     None                 No orders to display              Procedures  Procedures         ED Course                               MDM  Impression:  Left ear pain, pharyngitis  Will plan to treat with Motrin and decongestants  Disposition  Final diagnoses:   None     ED Disposition     None      Follow-up Information    None         Patient's Medications   Discharge Prescriptions    No medications on file     No discharge procedures on file      ED Provider  Electronically Signed by           Tye Oconnor MD  01/24/20 4870

## 2020-05-26 ENCOUNTER — TRANSCRIBE ORDERS (OUTPATIENT)
Dept: ADMINISTRATIVE | Facility: HOSPITAL | Age: 45
End: 2020-05-26

## 2020-05-26 DIAGNOSIS — S46.012A STRAIN OF TENDON OF LEFT ROTATOR CUFF, INITIAL ENCOUNTER: ICD-10-CM

## 2020-05-26 DIAGNOSIS — R52 PAIN: Primary | ICD-10-CM

## 2020-06-12 ENCOUNTER — HOSPITAL ENCOUNTER (OUTPATIENT)
Dept: MRI IMAGING | Facility: HOSPITAL | Age: 45
Discharge: HOME | End: 2020-06-12
Payer: COMMERCIAL

## 2020-06-12 DIAGNOSIS — R52 PAIN: ICD-10-CM

## 2020-06-12 DIAGNOSIS — S46.012A STRAIN OF TENDON OF LEFT ROTATOR CUFF, INITIAL ENCOUNTER: ICD-10-CM

## 2020-06-12 PROCEDURE — 73221 MRI JOINT UPR EXTREM W/O DYE: CPT

## 2020-08-24 ENCOUNTER — HOSPITAL ENCOUNTER (EMERGENCY)
Facility: HOSPITAL | Age: 45
Discharge: HOME | End: 2020-08-24
Attending: EMERGENCY MEDICINE | Admitting: EMERGENCY MEDICINE
Payer: COMMERCIAL

## 2020-08-24 VITALS
TEMPERATURE: 98.2 F | WEIGHT: 278 LBS | SYSTOLIC BLOOD PRESSURE: 163 MMHG | RESPIRATION RATE: 20 BRPM | HEIGHT: 71 IN | OXYGEN SATURATION: 98 % | BODY MASS INDEX: 38.92 KG/M2 | HEART RATE: 81 BPM | DIASTOLIC BLOOD PRESSURE: 94 MMHG

## 2020-08-24 DIAGNOSIS — N48.89 PENILE IRRITATION: ICD-10-CM

## 2020-08-24 DIAGNOSIS — Z20.2 POSSIBLE EXPOSURE TO STD: Primary | ICD-10-CM

## 2020-08-24 LAB
BILIRUB UR QL STRIP: NEGATIVE
CLARITY UR: CLEAR
COLOR UR: YELLOW
COLOR, POC: NORMAL
GLUCOSE UR STRIP-MCNC: NEGATIVE MG/DL
HGB UR QL STRIP.AUTO: NEGATIVE
KETONES UR STRIP-MCNC: NEGATIVE MG/DL
LEUKOCYTE ESTERASE UR QL STRIP: NEGATIVE
NITRITE UR QL STRIP: NEGATIVE
PH UR STRIP.AUTO: 5.5 [PH] (ref 4.5–8)
PROT UR STRIP-MCNC: NEGATIVE MG/DL
SP GR UR STRIP.AUTO: >=1.03 (ref 1–1.03)
UROBILINOGEN UR QL STRIP.AUTO: 1 E.U./DL

## 2020-08-24 PROCEDURE — 99284 EMERGENCY DEPT VISIT MOD MDM: CPT | Performed by: PHYSICIAN ASSISTANT

## 2020-08-24 PROCEDURE — 81003 URINALYSIS AUTO W/O SCOPE: CPT

## 2020-08-24 PROCEDURE — 99283 EMERGENCY DEPT VISIT LOW MDM: CPT

## 2020-08-24 PROCEDURE — 87491 CHLMYD TRACH DNA AMP PROBE: CPT | Performed by: PHYSICIAN ASSISTANT

## 2020-08-24 PROCEDURE — 87591 N.GONORRHOEAE DNA AMP PROB: CPT | Performed by: PHYSICIAN ASSISTANT

## 2020-08-24 NOTE — ED PROVIDER NOTES
History  Chief Complaint   Patient presents with    Exposure to STD     Patient states that he had sex approx 2 weeks ago where the condom broke and states that the "vaginal was dry and it was rubbing"  Complaining of groin/penis pain  Denies any trouble urinating or foul smells  PATO MONROY  is a 41year old male presenting to the ED with concerns for an STI  Patient states he had sex almost 2 weeks ago and the condom broke  Patient states his partner was very dry and he kept going anyway despite the condom breaking  Patient has concern for STIs because he still continued having sex despite the condom not being intact  This caused friction and abrasions on the shaft of his penis just below the head  Patient states the area has been irritated and he has been vigorously scrubbing it with peroxide and with soap in the shower  Patient is concerned because it has been almost 2 weeks and the irritation has not yet gone away  He reports continued scrubbing of his penis because he is concerned for STIs  Patient denies fevers, chills, abdominal pain, nausea, vomiting, diarrhea, dysuria, hematuria, urgency, frequency, testicular pain and penile discharge  Prior to Admission Medications   Prescriptions Last Dose Informant Patient Reported? Taking?    cyclobenzaprine (FLEXERIL) 10 mg tablet Not Taking at Unknown time  Yes No   Sig: Take 10 mg by mouth daily as needed   dextromethorphan-guaifenesin (MUCINEX DM)  MG per 12 hr tablet   No No   Sig: Take 1 tablet by mouth every 12 (twelve) hours   Patient not taking: Reported on 2020   fluticasone (FLONASE) 50 mcg/act nasal spray   No No   Si spray into each nostril daily   Patient not taking: Reported on 2020   ibuprofen (MOTRIN) 400 mg tablet   No No   Sig: Take 1 tablet (400 mg total) by mouth every 6 (six) hours as needed for mild pain   Patient not taking: Reported on 2020   ibuprofen (MOTRIN) 600 mg tablet   No No   Sig: Take 1 tablet (600 mg total) by mouth every 8 (eight) hours as needed for mild pain or moderate pain   Patient not taking: Reported on 2020   ibuprofen (MOTRIN) 600 mg tablet Not Taking at Unknown time  No No   Sig: Take 1 tablet (600 mg total) by mouth every 6 (six) hours as needed for mild pain   Patient not taking: Reported on 2020   loratadine (CLARITIN) 10 mg tablet Not Taking at Unknown time  No No   Sig: Take 1 tablet (10 mg total) by mouth daily   Patient not taking: Reported on 2020   nicotine (NICODERM CQ) 14 mg/24hr TD 24 hr patch Not Taking at Unknown time  Yes No   Sig: Place 1 patch on the skin every 24 hours   nicotine polacrilex (NICORETTE) 4 mg gum Not Taking at Unknown time  Yes No   Sig: Chew 4 mg 4 (four) times a day as needed   oxymetazoline (AFRIN) 0 05 % nasal spray Not Taking at Unknown time  No No   Si sprays by Each Nare route 2 (two) times a day For 3 days only to avoid rebound congestion  Patient not taking: Reported on 2020      Facility-Administered Medications: None       History reviewed  No pertinent past medical history  History reviewed  No pertinent surgical history  History reviewed  No pertinent family history  I have reviewed and agree with the history as documented  E-Cigarette/Vaping    E-Cigarette Use Never User      E-Cigarette/Vaping Substances     Social History     Tobacco Use    Smoking status: Current Every Day Smoker     Types: Cigarettes    Smokeless tobacco: Never Used    Tobacco comment: on weekends   Substance Use Topics    Alcohol use: Not Currently    Drug use: Yes     Types: Marijuana       Review of Systems   Constitutional: Negative for chills and fever  HENT: Negative for congestion and sore throat  Eyes: Negative for photophobia and visual disturbance  Respiratory: Negative for cough and shortness of breath  Cardiovascular: Negative for chest pain and leg swelling     Gastrointestinal: Negative for abdominal pain, diarrhea, nausea and vomiting  Genitourinary: Positive for genital sores  Negative for difficulty urinating, discharge, dysuria, flank pain, frequency, hematuria, penile pain, penile swelling and testicular pain  Musculoskeletal: Negative for back pain, neck pain and neck stiffness  Skin: Negative for rash  Neurological: Negative for dizziness, light-headedness, numbness and headaches  Physical Exam  Physical Exam  Vitals signs and nursing note reviewed  Exam conducted with a chaperone present  Constitutional:       General: He is not in acute distress  Appearance: Normal appearance  He is obese  He is not ill-appearing, toxic-appearing or diaphoretic  Comments: Obese male sitting on chair in no acute distress  Appears anxious  HENT:      Head: Normocephalic and atraumatic  Right Ear: External ear normal       Left Ear: External ear normal    Eyes:      Extraocular Movements: Extraocular movements intact  Conjunctiva/sclera: Conjunctivae normal    Neck:      Musculoskeletal: Normal range of motion and neck supple  Cardiovascular:      Rate and Rhythm: Normal rate and regular rhythm  Pulses: Normal pulses  Heart sounds: Normal heart sounds  No murmur  No friction rub  No gallop  Pulmonary:      Effort: Pulmonary effort is normal       Breath sounds: Normal breath sounds  Abdominal:      General: Bowel sounds are normal       Palpations: Abdomen is soft  Tenderness: There is no abdominal tenderness  There is no right CVA tenderness or left CVA tenderness  Genitourinary:     Comments: Abrasions and erythema on shaft of penis just below the head  No vesicles, warts or other lesions noted  No penile discharge  No scrotal swelling  Musculoskeletal: Normal range of motion  Skin:     General: Skin is warm and dry  Capillary Refill: Capillary refill takes less than 2 seconds  Neurological:      General: No focal deficit present        Mental Status: He is alert and oriented to person, place, and time  Psychiatric:      Comments: Patient is anxious  Vital Signs  ED Triage Vitals [08/24/20 1306]   Temperature Pulse Respirations Blood Pressure SpO2   98 2 °F (36 8 °C) 81 20 163/94 98 %      Temp Source Heart Rate Source Patient Position - Orthostatic VS BP Location FiO2 (%)   Oral Monitor Sitting Left arm --      Pain Score       9           Vitals:    08/24/20 1306   BP: 163/94   Pulse: 81   Patient Position - Orthostatic VS: Sitting         Visual Acuity      ED Medications  Medications - No data to display    Diagnostic Studies  Results Reviewed     Procedure Component Value Units Date/Time    Chlamydia/GC amplified DNA by PCR [[id number]]  (Normal) Collected:  08/24/20 1337    Lab Status:  Final result Specimen:  Urine, Other Updated:  08/25/20 0552     N gonorrhoeae, DNA Probe Negative     Chlamydia trachomatis, DNA Probe Negative    Narrative:       Test performed using PCR amplification of target DNA  This test is intended as an aid in the diagnosis of Chlamydial and gonococcal disease  This test has not been evaluated in patients younger than 15years of age and is not recommended for evaluation of suspected sexual abuse  Additional testing is recommended when the results do not correlate with clinical signs and symptoms        Urine Macroscopic, POC [[id number]] Collected:  08/24/20 1339    Lab Status:  Final result Specimen:  Urine Updated:  08/24/20 1341     Color, UA Yellow     Clarity, UA Clear     pH, UA 5 5     Leukocytes, UA Negative     Nitrite, UA Negative     Protein, UA Negative mg/dl      Glucose, UA Negative mg/dl      Ketones, UA Negative mg/dl      Urobilinogen, UA 1 0 E U /dl      Bilirubin, UA Negative     Blood, UA Negative     Specific Gravity, UA >=1 030    Narrative:       CLINITEK RESULT    POCT urinalysis dipstick [[id number]]  (Normal) Resulted:  08/24/20 1336    Lab Status:  Final result Specimen:  Urine Updated:  08/24/20 1337 Color, UA   No orders to display              Procedures  Procedures         ED Course       US AUDIT      Most Recent Value   Initial Alcohol Screen: US AUDIT-C    1  How often do you have a drink containing alcohol?  0 Filed at: 08/24/2020 1332   2  How many drinks containing alcohol do you have on a typical day you are drinking? 0 Filed at: 08/24/2020 1332   3a  Male UNDER 65: How often do you have five or more drinks on one occasion? 0 Filed at: 08/24/2020 1332   3b  FEMALE Any Age, or MALE 65+: How often do you have 4 or more drinks on one occassion? 0 Filed at: 08/24/2020 1332   Audit-C Score  0 Filed at: 08/24/2020 1332                  JUSTIN/DAST-10      Most Recent Value   How many times in the past year have you    Used an illegal drug or used a prescription medication for non-medical reasons? Weekly Filed at: 08/24/2020 1332                                MDM  Number of Diagnoses or Management Options  Penile irritation:   Possible exposure to STD:   Diagnosis management comments: Normal urinalysis  GC/chlamydia test pending  Instructed patient to stop applying peroxide to and vigorously scrubbing his penis  Patient is causing further irritation by doing this  He may apply Neosporin to the area as needed  Instructed patient to follow-up with his PCP this week  Information for Urology also provided if needed  Patient reassured and grateful  The management plan was discussed in detail with the patient at bedside and all questions were answered  Prior to discharge, he was provided both verbal and written instructions  Discussed signs and symptoms for which to return to the emergency department  All questions were answered and patient was comfortable with the plan of care and discharged to home  Instructed the patient to follow up with his PCP as provided in his written instructions   Patient verbalized understanding of our discussion and plan of care, and agrees to return to the Emergency Department for concerns and progression of illness  Disposition  Final diagnoses:   Possible exposure to STD   Penile irritation     Time reflects when diagnosis was documented in both MDM as applicable and the Disposition within this note     Time User Action Codes Description Comment    8/24/2020  1:41 PM Higinio Mei [Z20 2] Possible exposure to STD     8/24/2020  1:46  E Demetrice Bolaños 1213 [D69 14] Penile irritation       ED Disposition     ED Disposition Condition Date/Time Comment    Discharge Stable Mon Aug 24, 2020  1:41 PM Lesly Ro discharge to home/self care              Follow-up Information     Follow up With Specialties Details Why Contact Info Additional [address] [facility], DO Family Medicine In 1 week  [address] [address]  [phone]       [facility]  As needed, If symptoms worsen [address]  [place] [address] [facility] [id number]  [address] [place] [address] For Urology [address], [address] [facility]          Discharge Medication List as of 8/24/2020  1:46 PM      CONTINUE these medications which have NOT CHANGED    Details   cyclobenzaprine (FLEXERIL) 10 mg tablet Take 10 mg by mouth daily as needed, Starting Tue 12/17/2019, Historical Med      dextromethorphan-guaifenesin ([place] [address] DM)  MG per 12 hr tablet Take 1 tablet by mouth every 12 (twelve) hours, Starting Tue 9/19/2017, Print      fluticasone (FLONASE) 50 mcg/act nasal spray 1 spray into each nostril daily, Starting Tue 9/19/2017, Print      !! ibuprofen (MOTRIN) 400 mg tablet Take 1 tablet (400 mg total) by mouth every 6 (six) hours as needed for mild pain, Starting Mon 1/14/2019, Print      !! ibuprofen (MOTRIN) 600 mg tablet Take 1 tablet (600 mg total) by mouth every 8 (eight) hours as needed for mild pain or moderate pain, Starting Mon 12/30/2019, Print      !! ibuprofen (MOTRIN) 600 mg tablet Take 1 tablet (600 mg total) by mouth every 6 (six) hours as needed for mild pain, Starting Fri 1/24/2020, Print      loratadine (CLARITIN) 10 mg tablet Take 1 tablet (10 mg total) by mouth daily, Starting Fri 1/24/2020, Print      nicotine (NICODERM CQ) 14 mg/24hr TD 24 hr patch Place 1 patch on the skin every 24 hours, Starting Tue 12/17/2019, Historical Med      nicotine polacrilex (NICORETTE) 4 mg gum Chew 4 mg 4 (four) times a day as needed, Starting Tue 12/17/2019, Historical Med      oxymetazoline (AFRIN) 0 05 % nasal spray 2 sprays by Each Nare route 2 (two) times a day For 3 days only to avoid rebound congestion  , Starting Tue 1/14/2020, Print       !! - Potential duplicate medications found  Please discuss with provider  No discharge procedures on file      PDMP Review     None          ED Provider  Electronically Signed by           Liseth Stewart PA-C  08/27/20 013

## 2020-08-25 LAB
C TRACH DNA SPEC QL NAA+PROBE: NEGATIVE
N GONORRHOEA DNA SPEC QL NAA+PROBE: NEGATIVE

## 2021-03-07 ENCOUNTER — HOSPITAL ENCOUNTER (EMERGENCY)
Facility: HOSPITAL | Age: 46
Discharge: HOME | End: 2021-03-07
Attending: EMERGENCY MEDICINE | Admitting: EMERGENCY MEDICINE
Payer: COMMERCIAL

## 2021-03-07 VITALS
RESPIRATION RATE: 18 BRPM | DIASTOLIC BLOOD PRESSURE: 80 MMHG | TEMPERATURE: 97.6 F | SYSTOLIC BLOOD PRESSURE: 127 MMHG | WEIGHT: 280 LBS | HEART RATE: 89 BPM | HEIGHT: 72 IN | OXYGEN SATURATION: 95 % | BODY MASS INDEX: 37.93 KG/M2

## 2021-03-07 DIAGNOSIS — L03.90 CELLULITIS: Primary | ICD-10-CM

## 2021-03-07 PROCEDURE — 99283 EMERGENCY DEPT VISIT LOW MDM: CPT

## 2021-03-07 PROCEDURE — 99284 EMERGENCY DEPT VISIT MOD MDM: CPT | Performed by: PHYSICIAN ASSISTANT

## 2021-03-07 RX ORDER — SULFAMETHOXAZOLE AND TRIMETHOPRIM 800; 160 MG/1; MG/1
1 TABLET ORAL 2 TIMES DAILY
Qty: 10 TABLET | Refills: 0 | Status: SHIPPED | OUTPATIENT
Start: 2021-03-07 | End: 2021-03-12

## 2021-03-07 RX ORDER — CEPHALEXIN 500 MG/1
500 CAPSULE ORAL EVERY 12 HOURS SCHEDULED
Qty: 10 CAPSULE | Refills: 0 | Status: SHIPPED | OUTPATIENT
Start: 2021-03-07 | End: 2021-03-12

## 2021-03-07 NOTE — ED PROVIDER NOTES
History  Chief Complaint   Patient presents with    Thumb Swelling     RIGHT THUMB PAINA ND SWELLING  PT REPORTS WASHING HANDS OFTEN AND HAVING DRY CRACKED HANDS  PURULENT DRAINAGE FROM CUTICLE  STATES THIS STARTED 5 DAYS AGO     Patient presents emergency room with a 1 week history of pain in his right thumb  He states it has been red and has actively been draining  He states is not getting any better  He complains of pain at the site  He denies any fever chills  He denies any nausea or vomiting or diarrhea  Denies any specific joint pain or decreased range of motion of his thumb  He denies any numbness, tingling, weakness in his right hand  History provided by:  Patient  Rash  Location: right thumb  Quality: draining, painful and redness    Pain details:     Quality:  Aching    Severity:  Mild    Onset quality:  Gradual    Duration:  1 week    Timing:  Intermittent    Progression:  Waxing and waning  Severity:  Mild  Duration:  1 week  Progression:  Worsening  Chronicity:  New  Relieved by:  Nothing  Worsened by:  Contact  Ineffective treatments:  None tried  Associated symptoms: no fever and no joint pain        Prior to Admission Medications   Prescriptions Last Dose Informant Patient Reported? Taking?    cyclobenzaprine (FLEXERIL) 10 mg tablet Not Taking at Unknown time  Yes No   Sig: Take 10 mg by mouth daily as needed   dextromethorphan-guaifenesin (MUCINEX DM)  MG per 12 hr tablet   No No   Sig: Take 1 tablet by mouth every 12 (twelve) hours   Patient not taking: Reported on 2020   fluticasone (FLONASE) 50 mcg/act nasal spray   No No   Si spray into each nostril daily   Patient not taking: Reported on 2020   ibuprofen (MOTRIN) 400 mg tablet   No No   Sig: Take 1 tablet (400 mg total) by mouth every 6 (six) hours as needed for mild pain   Patient not taking: Reported on 2020   ibuprofen (MOTRIN) 600 mg tablet   No No   Sig: Take 1 tablet (600 mg total) by mouth every 8 (eight) hours as needed for mild pain or moderate pain   Patient not taking: Reported on 2020   ibuprofen (MOTRIN) 600 mg tablet   No No   Sig: Take 1 tablet (600 mg total) by mouth every 6 (six) hours as needed for mild pain   Patient not taking: Reported on 2020   loratadine (CLARITIN) 10 mg tablet   No No   Sig: Take 1 tablet (10 mg total) by mouth daily   Patient not taking: Reported on 2020   nicotine (NICODERM CQ) 14 mg/24hr TD 24 hr patch   Yes No   Sig: Place 1 patch on the skin every 24 hours   nicotine polacrilex (NICORETTE) 4 mg gum   Yes No   Sig: Chew 4 mg 4 (four) times a day as needed   oxymetazoline (AFRIN) 0 05 % nasal spray   No No   Si sprays by Each Nare route 2 (two) times a day For 3 days only to avoid rebound congestion  Patient not taking: Reported on 2020      Facility-Administered Medications: None       History reviewed  No pertinent past medical history  History reviewed  No pertinent surgical history  History reviewed  No pertinent family history  I have reviewed and agree with the history as documented  E-Cigarette/Vaping    E-Cigarette Use Never User      E-Cigarette/Vaping Substances     Social History     Tobacco Use    Smoking status: Current Every Day Smoker     Types: Cigarettes    Smokeless tobacco: Never Used    Tobacco comment: on weekends   Substance Use Topics    Alcohol use: Not Currently    Drug use: Yes     Types: Marijuana       Review of Systems   Constitutional: Negative for fever  Musculoskeletal: Negative for arthralgias  Skin: Positive for color change and rash  All other systems reviewed and are negative  Physical Exam  Physical Exam  Vitals signs and nursing note reviewed  Constitutional:       Appearance: Normal appearance  He is obese  Eyes:      General:         Right eye: No discharge  Left eye: No discharge        Conjunctiva/sclera: Conjunctivae normal    Pulmonary:      Effort: Pulmonary effort is normal    Musculoskeletal: Normal range of motion  Skin:     Capillary Refill: Capillary refill takes less than 2 seconds  Comments: Inspection of the right thumb-this is a atraumatic upon inspection  There is remnants of previous serous drainage present  There is mild erythema along the paronychia but no fluctuance  There is nothing to be drained at this point  Capillary refills less than 2 seconds  There is good range of motion of the thumb in all planes  Neurological:      Mental Status: He is alert  Psychiatric:         Mood and Affect: Mood normal          Behavior: Behavior normal          Thought Content: Thought content normal          Judgment: Judgment normal          Vital Signs  ED Triage Vitals [03/07/21 0939]   Temperature Pulse Respirations Blood Pressure SpO2   97 6 °F (36 4 °C) 89 18 127/80 95 %      Temp Source Heart Rate Source Patient Position - Orthostatic VS BP Location FiO2 (%)   Oral Monitor -- -- --      Pain Score       --           Vitals:    03/07/21 0939   BP: 127/80   Pulse: 89         Visual Acuity      ED Medications  Medications - No data to display    Diagnostic Studies  Results Reviewed     None                 No orders to display              Procedures  Procedures         ED Course                                           MDM  Number of Diagnoses or Management Options  Cellulitis: new and does not require workup  Risk of Complications, Morbidity, and/or Mortality  Presenting problems: moderate  Diagnostic procedures: moderate  Management options: moderate  General comments: Patient presented to the emergency room with complaints of pain and redness and drainage from the dorsal aspect of his right thumb  He describes of paronychia that open and drain spontaneously  He describes of residual redness  He was seen and evaluated  He was diagnosed with localized cellulitis  There is no evidence that the patient needed to have an I and D today    He is placed on Keflex and Bactrim twice daily for the next 5 days  He will soak his thumb in warm water for 20 minutes 3 to 4 times a day  He was given reasons to return to the emergency room should his symptoms worsen  Patient Progress  Patient progress: stable      Disposition  Final diagnoses:   Cellulitis - Thumb  Time reflects when diagnosis was documented in both MDM as applicable and the Disposition within this note     Time User Action Codes Description Comment    3/7/2021 10:39 AM Suzan Bailey Add [L03 90] Cellulitis     3/7/2021 10:39 AM Anne Marie Lukas Ahsan Linsey Modify [L03 90] Cellulitis Thumb  ED Disposition     ED Disposition Condition Date/Time Comment    Discharge Stable Sun Mar 7, 2021 10:41 AM [place] discharge to home/self care              Follow-up Information     Follow up With Specialties Details Why Contact Info Additional [address] Emergency Department Emergency Medicine  As needed, If symptoms worsen [address] Emergency Department, [address], [place], [place], [address]          Discharge Medication List as of 3/7/2021 10:42 AM      START taking these medications    Details   cephalexin (KEFLEX) 500 mg capsule Take 1 capsule (500 mg total) by mouth every 12 (twelve) hours for 5 days, Starting Sun 3/7/2021, Until Fri 3/12/2021, Normal      sulfamethoxazole-trimethoprim (BACTRIM DS) 800-160 mg per tablet Take 1 tablet by mouth 2 (two) times a day for 5 days smx-tmp DS (BACTRIM) 800-160 mg tabs (1tab q12 D10), Starting Sun 3/7/2021, Until Fri 3/12/2021, Normal         CONTINUE these medications which have NOT CHANGED    Details   cyclobenzaprine (FLEXERIL) 10 mg tablet Take 10 mg by mouth daily as needed, Starting Tue 12/17/2019, Historical Med      dextromethorphan-guaifenesin (MUCINEX DM)  MG per 12 hr tablet Take 1 tablet by mouth every 12 (twelve) hours, Starting Tue 9/19/2017, Print      fluticasone (FLONASE) 50 mcg/act nasal spray 1 spray into each nostril daily, Starting Tue 9/19/2017, Print      !! ibuprofen (MOTRIN) 400 mg tablet Take 1 tablet (400 mg total) by mouth every 6 (six) hours as needed for mild pain, Starting Mon 1/14/2019, Print      !! ibuprofen (MOTRIN) 600 mg tablet Take 1 tablet (600 mg total) by mouth every 8 (eight) hours as needed for mild pain or moderate pain, Starting Mon 12/30/2019, Print      !! ibuprofen (MOTRIN) 600 mg tablet Take 1 tablet (600 mg total) by mouth every 6 (six) hours as needed for mild pain, Starting Fri 1/24/2020, Print      loratadine (CLARITIN) 10 mg tablet Take 1 tablet (10 mg total) by mouth daily, Starting Fri 1/24/2020, Print      nicotine (NICODERM CQ) 14 mg/24hr TD 24 hr patch Place 1 patch on the skin every 24 hours, Starting Tue 12/17/2019, Historical Med      nicotine polacrilex (NICORETTE) 4 mg gum Chew 4 mg 4 (four) times a day as needed, Starting Tue 12/17/2019, Historical Med      oxymetazoline (AFRIN) 0 05 % nasal spray 2 sprays by Each Nare route 2 (two) times a day For 3 days only to avoid rebound congestion  , Starting Tue 1/14/2020, Print       !! - Potential duplicate medications found  Please discuss with provider  No discharge procedures on file      PDMP Review     None          ED Provider  Electronically Signed by           Temo Fletcher PA-C  03/07/21 3672

## 2021-03-07 NOTE — DISCHARGE INSTRUCTIONS
Warm soaks for 20 minutes 3 4 times a day  Please take the antibiotics as instructed    If her symptoms worsen, return to the emergency room for repeat exam

## 2022-01-13 ENCOUNTER — HOSPITAL ENCOUNTER (EMERGENCY)
Facility: HOSPITAL | Age: 47
Discharge: HOME | End: 2022-01-13
Attending: EMERGENCY MEDICINE
Payer: COMMERCIAL

## 2022-01-13 ENCOUNTER — APPOINTMENT (EMERGENCY)
Dept: CT IMAGING | Facility: HOSPITAL | Age: 47
End: 2022-01-13
Payer: COMMERCIAL

## 2022-01-13 VITALS
TEMPERATURE: 98.7 F | OXYGEN SATURATION: 98 % | DIASTOLIC BLOOD PRESSURE: 94 MMHG | HEIGHT: 72 IN | SYSTOLIC BLOOD PRESSURE: 141 MMHG | HEART RATE: 91 BPM | RESPIRATION RATE: 16 BRPM | BODY MASS INDEX: 37.93 KG/M2 | WEIGHT: 280 LBS

## 2022-01-13 DIAGNOSIS — S16.1XXA STRAIN OF NECK MUSCLE, INITIAL ENCOUNTER: ICD-10-CM
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
## 2022-01-13 DIAGNOSIS — S00.83XA CONTUSION OF FACE, INITIAL ENCOUNTER: ICD-10-CM

## 2022-01-13 DIAGNOSIS — S09.90XA INJURY OF HEAD, INITIAL ENCOUNTER: ICD-10-CM

## 2022-01-13 PROCEDURE — 99284 EMERGENCY DEPT VISIT MOD MDM: CPT | Performed by: EMERGENCY MEDICINE

## 2022-01-13 PROCEDURE — 99284 EMERGENCY DEPT VISIT MOD MDM: CPT

## 2022-01-13 PROCEDURE — 70450 CT HEAD/BRAIN W/O DYE: CPT

## 2022-01-13 PROCEDURE — 72125 CT NECK SPINE W/O DYE: CPT

## 2022-01-13 PROCEDURE — G1004 CDSM NDSC: HCPCS

## 2022-01-13 RX ORDER — ACETAMINOPHEN 325 MG/1
650 TABLET ORAL ONCE
Status: COMPLETED | OUTPATIENT
Start: 2022-01-13 | End: 2022-01-13

## 2022-01-13 RX ADMIN — ACETAMINOPHEN 650 MG: 325 TABLET, FILM COATED ORAL at 17:39
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
## 2023-10-31 ENCOUNTER — HOSPITAL ENCOUNTER (EMERGENCY)
Facility: HOSPITAL | Age: 48
Discharge: HOME | End: 2023-11-01
Attending: EMERGENCY MEDICINE
Payer: COMMERCIAL

## 2023-10-31 VITALS
DIASTOLIC BLOOD PRESSURE: 85 MMHG | SYSTOLIC BLOOD PRESSURE: 166 MMHG | RESPIRATION RATE: 18 BRPM | OXYGEN SATURATION: 99 % | HEART RATE: 88 BPM | TEMPERATURE: 98.1 F

## 2023-10-31 DIAGNOSIS — R07.89 CHEST WALL PAIN: Primary | ICD-10-CM

## 2023-10-31 PROCEDURE — 99285 EMERGENCY DEPT VISIT HI MDM: CPT

## 2023-10-31 PROCEDURE — 93005 ELECTROCARDIOGRAM TRACING: CPT

## 2023-10-31 NOTE — Clinical Note
Gabe Moralez was seen and treated in our emergency department on 10/31/2023. Diagnosis: Chest pain    Meir Espinal  may return to work on return date. He may return on this date: 11/02/2023         If you have any questions or concerns, please don't hesitate to call.       Alie Conn, DO    ______________________________           _______________          _______________  Hospital Representative                              Date                                Time

## 2023-11-01 ENCOUNTER — APPOINTMENT (EMERGENCY)
Dept: RADIOLOGY | Facility: HOSPITAL | Age: 48
End: 2023-11-01
Payer: COMMERCIAL

## 2023-11-01 LAB
ALBUMIN SERPL BCP-MCNC: 4.4 G/DL (ref 3.5–5)
ALP SERPL-CCNC: 70 U/L (ref 34–104)
ALT SERPL W P-5'-P-CCNC: 20 U/L (ref 7–52)
ANION GAP SERPL CALCULATED.3IONS-SCNC: 6 MMOL/L
AST SERPL W P-5'-P-CCNC: 17 U/L (ref 13–39)
ATRIAL RATE: 78 BPM
BASOPHILS # BLD AUTO: 0.07 THOUSANDS/ÂΜL (ref 0–0.1)
BASOPHILS NFR BLD AUTO: 1 % (ref 0–1)
BILIRUB SERPL-MCNC: 0.4 MG/DL (ref 0.2–1)
BUN SERPL-MCNC: 16 MG/DL (ref 5–25)
CALCIUM SERPL-MCNC: 9.4 MG/DL (ref 8.4–10.2)
CARDIAC TROPONIN I PNL SERPL HS: 4 NG/L
CHLORIDE SERPL-SCNC: 100 MMOL/L (ref 96–108)
CO2 SERPL-SCNC: 28 MMOL/L (ref 21–32)
CREAT SERPL-MCNC: 0.86 MG/DL (ref 0.6–1.3)
EOSINOPHIL # BLD AUTO: 0.32 THOUSAND/ÂΜL (ref 0–0.61)
EOSINOPHIL NFR BLD AUTO: 3 % (ref 0–6)
ERYTHROCYTE [DISTWIDTH] IN BLOOD BY AUTOMATED COUNT: 15.1 % (ref 11.6–15.1)
GFR SERPL CREATININE-BSD FRML MDRD: 103 ML/MIN/1.73SQ M
GLUCOSE SERPL-MCNC: 97 MG/DL (ref 65–140)
HCT VFR BLD AUTO: 50.7 % (ref 36.5–49.3)
HGB BLD-MCNC: 15.6 G/DL (ref 12–17)
IMM GRANULOCYTES # BLD AUTO: 0.12 THOUSAND/UL (ref 0–0.2)
IMM GRANULOCYTES NFR BLD AUTO: 1 % (ref 0–2)
LYMPHOCYTES # BLD AUTO: 3.44 THOUSANDS/ÂΜL (ref 0.6–4.47)
LYMPHOCYTES NFR BLD AUTO: 27 % (ref 14–44)
MCH RBC QN AUTO: 22.8 PG (ref 26.8–34.3)
MCHC RBC AUTO-ENTMCNC: 30.8 G/DL (ref 31.4–37.4)
MCV RBC AUTO: 74 FL (ref 82–98)
MONOCYTES # BLD AUTO: 0.98 THOUSAND/ÂΜL (ref 0.17–1.22)
MONOCYTES NFR BLD AUTO: 8 % (ref 4–12)
NEUTROPHILS # BLD AUTO: 7.74 THOUSANDS/ÂΜL (ref 1.85–7.62)
NEUTS SEG NFR BLD AUTO: 60 % (ref 43–75)
NRBC BLD AUTO-RTO: 0 /100 WBCS
P AXIS: 68 DEGREES
PLATELET # BLD AUTO: 333 THOUSANDS/UL (ref 149–390)
PMV BLD AUTO: 11.2 FL (ref 8.9–12.7)
POTASSIUM SERPL-SCNC: 4.2 MMOL/L (ref 3.5–5.3)
PR INTERVAL: 150 MS
PROT SERPL-MCNC: 7.9 G/DL (ref 6.4–8.4)
QRS AXIS: 53 DEGREES
QRSD INTERVAL: 90 MS
QT INTERVAL: 348 MS
QTC INTERVAL: 396 MS
RBC # BLD AUTO: 6.84 MILLION/UL (ref 3.88–5.62)
SODIUM SERPL-SCNC: 134 MMOL/L (ref 135–147)
T WAVE AXIS: 28 DEGREES
VENTRICULAR RATE: 78 BPM
WBC # BLD AUTO: 12.67 THOUSAND/UL (ref 4.31–10.16)

## 2023-11-01 PROCEDURE — 99285 EMERGENCY DEPT VISIT HI MDM: CPT | Performed by: EMERGENCY MEDICINE

## 2023-11-01 PROCEDURE — 84484 ASSAY OF TROPONIN QUANT: CPT

## 2023-11-01 PROCEDURE — 71046 X-RAY EXAM CHEST 2 VIEWS: CPT

## 2023-11-01 PROCEDURE — 85025 COMPLETE CBC W/AUTO DIFF WBC: CPT

## 2023-11-01 PROCEDURE — 36415 COLL VENOUS BLD VENIPUNCTURE: CPT

## 2023-11-01 PROCEDURE — 80053 COMPREHEN METABOLIC PANEL: CPT

## 2023-11-01 PROCEDURE — 96374 THER/PROPH/DIAG INJ IV PUSH: CPT

## 2023-11-01 PROCEDURE — 93010 ELECTROCARDIOGRAM REPORT: CPT | Performed by: INTERNAL MEDICINE

## 2023-11-01 RX ORDER — KETOROLAC TROMETHAMINE 30 MG/ML
15 INJECTION, SOLUTION INTRAMUSCULAR; INTRAVENOUS ONCE
Status: COMPLETED | OUTPATIENT
Start: 2023-11-01 | End: 2023-11-01

## 2023-11-01 RX ORDER — IBUPROFEN 600 MG/1
600 TABLET ORAL EVERY 6 HOURS PRN
Qty: 30 TABLET | Refills: 0 | Status: SHIPPED | OUTPATIENT
Start: 2023-11-01

## 2023-11-01 RX ADMIN — KETOROLAC TROMETHAMINE 15 MG: 30 INJECTION, SOLUTION INTRAMUSCULAR; INTRAVENOUS at 00:26

## 2023-11-01 NOTE — DISCHARGE INSTRUCTIONS
You were seen in the emergency department for chest pain. Your work-up did not show any concerning findings. Your symptoms are most likely due to musculoskeletal pain. A prescription for ibuprofen was sent to your pharmacy. This will help with inflammation. Call the attached number to schedule a follow-up appointment with primary care. If you have worsening symptoms please return to the emergency department.

## 2023-11-01 NOTE — ED ATTENDING ATTESTATION
10/31/2023  IFroilan MD, saw and evaluated the patient. I have discussed the patient with the resident/non-physician practitioner and agree with the resident's/non-physician practitioner's findings, Plan of Care, and MDM as documented in the resident's/non-physician practitioner's note, except where noted. All available labs and Radiology studies were reviewed. I was present for key portions of any procedure(s) performed by the resident/non-physician practitioner and I was immediately available to provide assistance. At this point I agree with the current assessment done in the Emergency Department. I have conducted an independent evaluation of this patient a history and physical is as follows:    53 y.o. male presenting with right sided chest pain that started yesterday, worsened today. Right sided lateral thorax. This has been associated with a dry, nonproductive cough over the past 2 days. No injury/trauma. No cough. No fevers. No shortness of breath. No abdominal pain, nausea or vomiting. No history of DVT or PE. No recent prolonged immobilization. Patient smokes several cigarettes per week socially. ED Triage Vitals   Temperature Pulse Respirations Blood Pressure SpO2   10/31/23 2345 10/31/23 2344 10/31/23 2344 10/31/23 2344 10/31/23 2344   98.1 °F (36.7 °C) 88 18 166/85 99 %      Temp Source Heart Rate Source Patient Position - Orthostatic VS BP Location FiO2 (%)   10/31/23 2345 -- -- -- --   Oral          Pain Score       11/01/23 0026       10 - Worst Possible Pain           Vital signs and nursing notes reviewed    CONSTITUTIONAL: male appearing stated age resting in bed, in no acute distress  HEENT: atraumatic, normocephalic. Sclera anicteric, conjunctiva are not injected. Moist oral mucosa  CARDIOVASCULAR/CHEST: RRR, no M/R/G. 2+ radial pulses  PULMONARY: Breathing comfortably on RA. Breath sounds are equal and clear to auscultation  ABDOMEN: non-distended.    MSK: moves all extremities, no deformities, no peripheral edema, no calf asymmetry  NEURO: Awake, alert, and oriented x 3. Face symmetric. Moves all extremities spontaneously. No focal neurologic deficits  SKIN: Warm, appears well-perfused  MENTAL STATUS: Normal affect    Labs Reviewed   CBC AND DIFFERENTIAL - Abnormal       Result Value Ref Range Status    WBC 12.67 (*) 4.31 - 10.16 Thousand/uL Final    RBC 6.84 (*) 3.88 - 5.62 Million/uL Final    Hemoglobin 15.6  12.0 - 17.0 g/dL Final    Hematocrit 50.7 (*) 36.5 - 49.3 % Final    MCV 74 (*) 82 - 98 fL Final    MCH 22.8 (*) 26.8 - 34.3 pg Final    MCHC 30.8 (*) 31.4 - 37.4 g/dL Final    RDW 15.1  11.6 - 15.1 % Final    MPV 11.2  8.9 - 12.7 fL Final    Platelets 075  998 - 390 Thousands/uL Final    nRBC 0  /100 WBCs Final    Neutrophils Relative 60  43 - 75 % Final    Immat GRANS % 1  0 - 2 % Final    Lymphocytes Relative 27  14 - 44 % Final    Monocytes Relative 8  4 - 12 % Final    Eosinophils Relative 3  0 - 6 % Final    Basophils Relative 1  0 - 1 % Final    Neutrophils Absolute 7.74 (*) 1.85 - 7.62 Thousands/µL Final    Immature Grans Absolute 0.12  0.00 - 0.20 Thousand/uL Final    Lymphocytes Absolute 3.44  0.60 - 4.47 Thousands/µL Final    Monocytes Absolute 0.98  0.17 - 1.22 Thousand/µL Final    Eosinophils Absolute 0.32  0.00 - 0.61 Thousand/µL Final    Basophils Absolute 0.07  0.00 - 0.10 Thousands/µL Final   COMPREHENSIVE METABOLIC PANEL - Abnormal    Sodium 134 (*) 135 - 147 mmol/L Final    Potassium 4.2  3.5 - 5.3 mmol/L Final    Chloride 100  96 - 108 mmol/L Final    CO2 28  21 - 32 mmol/L Final    ANION GAP 6  mmol/L Final    BUN 16  5 - 25 mg/dL Final    Creatinine 0.86  0.60 - 1.30 mg/dL Final    Comment: Standardized to IDMS reference method    Glucose 97  65 - 140 mg/dL Final    Comment: If the patient is fasting, the ADA then defines impaired fasting glucose as > 100 mg/dL and diabetes as > or equal to 123 mg/dL.     Calcium 9.4  8.4 - 10.2 mg/dL Final    AST 17  13 - 39 U/L Final    ALT 20  7 - 52 U/L Final    Comment: Specimen collection should occur prior to Sulfasalazine administration due to the potential for falsely depressed results. Alkaline Phosphatase 70  34 - 104 U/L Final    Total Protein 7.9  6.4 - 8.4 g/dL Final    Albumin 4.4  3.5 - 5.0 g/dL Final    Total Bilirubin 0.40  0.20 - 1.00 mg/dL Final    Comment: Use of this assay is not recommended for patients undergoing treatment with eltrombopag due to the potential for falsely elevated results. N-acetyl-p-benzoquinone imine (metabolite of Acetaminophen) will generate erroneously low results in samples for patients that have taken an overdose of Acetaminophen. eGFR 103  ml/min/1.73sq m Final    Narrative:     [place] guidelines for Chronic Kidney Disease (CKD):     Stage 1 with normal or high GFR (GFR > 90 mL/min/1.73 square meters)    Stage 2 Mild CKD (GFR = 60-89 mL/min/1.73 square meters)    Stage 3A Moderate CKD (GFR = 45-59 mL/min/1.73 square meters)    Stage 3B Moderate CKD (GFR = 30-44 mL/min/1.73 square meters)    Stage 4 Severe CKD (GFR = 15-29 mL/min/1.73 square meters)    Stage 5 End Stage CKD (GFR <15 mL/min/1.73 square meters)  Note: GFR calculation is accurate only with a steady state creatinine   HS TROPONIN I 0HR - Normal    hs TnI 0hr 4  "Refer to ACS Flowchart"- see link ng/L Final    Comment:                                              Initial (time 0) result  If >=50 ng/L, Myocardial injury suggested ;  Type of myocardial injury and treatment strategy  to be determined. If 5-49 ng/L, a delta result at 2 hours and or 4 hours will be needed to further evaluate. If <4 ng/L, and chest pain has been >3 hours since onset, patient may qualify for discharge based on the HEART score in the ED. If <5 ng/L and <3hours since onset of chest pain, a delta result at 2 hours will be needed to further evaluate.     HS Troponin 99th Percentile URL of a Health Population=12 ng/L with a 95% Confidence Interval of 8-18 ng/L. Second Troponin (time 2 hours)  If calculated delta >= 20 ng/L,  Myocardial injury suggested ; Type of myocardial injury and treatment strategy to be determined. If 5-49 ng/L and the calculated delta is 5-19 ng/L, consult medical service for evaluation. Continue evaluation for ischemia on ecg and other possible etiology and repeat hs troponin at 4 hours. If delta is <5 ng/L at 2 hours, consider discharge based on risk stratification via the HEART score (if in ED), or LINDA risk score in IP/Observation. HS Troponin 99th Percentile URL of a Health Population=12 ng/L with a 95% Confidence Interval of 8-18 ng/L.     XR chest 2 views   Final Result      No acute cardiopulmonary disease. Findings are stable            Workstation performed: [id number]           HEART Risk Score      Flowsheet Row Most Recent Value   Heart Score Risk Calculator    History 0 Filed at: 11/01/2023 0100   ECG 0 Filed at: 11/01/2023 0100   Age 1 Filed at: 11/01/2023 0100   Risk Factors 1 Filed at: 11/01/2023 0100   Troponin 0 Filed at: 11/01/2023 0100   HEART Score 2 Filed at: 11/01/2023 0100            ED Course     Medications   ketorolac (TORADOL) injection 15 mg (15 mg Intravenous Given 11/1/23 0026)     53 y.o. male presenting with chest pain. Vital Signs reviewed. Differential diagnosis includes acute coronary syndrome, pericarditis, myocarditis, PE, pneumonia, pneumothorax, dissection, pleurisy, musculoskeletal pain, versus another etiology of symptoms. History, physical exam and data collected so far do not support ACS, PE, pneumonia. Suspect musculoskeletal pain, possibly due to recent new onset of cough. Recommend NSAIDs. No wheezing at present and symptoms have only been present for 2 days, will hold off on steroids at present, though patient may progress to develop bronchitis at which time we recommend reassessment.     Procedure  ECG 12 Lead Documentation Only    Date/Time: 11/1/2023 12:22 AM    Performed by: Mary Vallejo MD  Authorized by: Mary Vallejo MD    Comments:      Normal sinus rhythm, ventricular rate 78, MD interval 150, QRS 90, QTc 396, normal axis, no ST/T wave changes to suggest ischemia, no STEMI. No significant change from prior EKG dated 8/22/2016.

## 2023-11-01 NOTE — ED PROVIDER NOTES
History  Chief Complaint   Patient presents with    Chest Pain     Pt describes stabbing R chest pain since yesterday. Pt states he has had bronchitis in the past and this feels similar. Denies SOB, denies cough/congestion. 72-year-old male with no significant past medical history who presents complaining of right-sided chest pain and cough for the past 2 days. The patient states that he has pain around his right lower ribs that is worse with coughing and deep inspiration. The patient states that he has had a nonproductive cough for the past 2 days. The patient states that he had similar pain in the past when he had bronchitis. The patient has not seen a primary care provider in years. The patient reports that he smokes "a few" cigarettes per week. Patient denies fever, chills, shortness of breath, nausea, vomiting, diarrhea, abdominal pain, lower extremity edema. Prior to Admission Medications   Prescriptions Last Dose Informant Patient Reported? Taking?    cyclobenzaprine (FLEXERIL) 10 mg tablet   Yes No   Sig: Take 10 mg by mouth daily as needed   dextromethorphan-guaifenesin (MUCINEX DM)  MG per 12 hr tablet   No No   Sig: Take 1 tablet by mouth every 12 (twelve) hours   Patient not taking: Reported on 2020   fluticasone (FLONASE) 50 mcg/act nasal spray   No No   Si spray into each nostril daily   Patient not taking: Reported on 2020   ibuprofen (MOTRIN) 400 mg tablet   No No   Sig: Take 1 tablet (400 mg total) by mouth every 6 (six) hours as needed for mild pain   Patient not taking: Reported on 2020   ibuprofen (MOTRIN) 600 mg tablet   No No   Sig: Take 1 tablet (600 mg total) by mouth every 8 (eight) hours as needed for mild pain or moderate pain   Patient not taking: Reported on 2020   ibuprofen (MOTRIN) 600 mg tablet   No No   Sig: Take 1 tablet (600 mg total) by mouth every 6 (six) hours as needed for mild pain   Patient not taking: Reported on 2020 loratadine (CLARITIN) 10 mg tablet   No No   Sig: Take 1 tablet (10 mg total) by mouth daily   Patient not taking: Reported on 2020   nicotine (NICODERM CQ) 14 mg/24hr TD 24 hr patch   Yes No   Sig: Place 1 patch on the skin every 24 hours   nicotine polacrilex (NICORETTE) 4 mg gum   Yes No   Sig: Chew 4 mg 4 (four) times a day as needed   oxymetazoline (AFRIN) 0.05 % nasal spray   No No   Si sprays by Each Nare route 2 (two) times a day For 3 days only to avoid rebound congestion. Patient not taking: Reported on 2020      Facility-Administered Medications: None       History reviewed. No pertinent past medical history. History reviewed. No pertinent surgical history. History reviewed. No pertinent family history. I have reviewed and agree with the history as documented. E-Cigarette/Vaping    E-Cigarette Use Never User      E-Cigarette/Vaping Substances     Social History     Tobacco Use    Smoking status: Some Days     Types: Cigarettes    Smokeless tobacco: Never    Tobacco comments:     on weekends   Vaping Use    Vaping Use: Never used   Substance Use Topics    Alcohol use: Not Currently    Drug use: Not Currently     Types: Marijuana        Review of Systems   Constitutional:  Negative for chills and fever. HENT:  Negative for congestion and sore throat. Eyes:  Negative for pain and redness. Respiratory:  Positive for cough. Negative for shortness of breath. Cardiovascular:  Positive for chest pain. Negative for palpitations. Gastrointestinal:  Negative for abdominal pain, diarrhea, nausea and vomiting. Genitourinary:  Negative for dysuria and hematuria. Musculoskeletal:  Negative for arthralgias and myalgias. Skin:  Negative for color change, pallor and rash. Neurological:  Negative for syncope, weakness, light-headedness, numbness and headaches. All other systems reviewed and are negative.       Physical Exam  ED Triage Vitals   Temperature Pulse Respirations Blood Pressure SpO2   10/31/23 2345 10/31/23 2344 10/31/23 2344 10/31/23 2344 10/31/23 2344   98.1 °F (36.7 °C) 88 18 166/85 99 %      Temp Source Heart Rate Source Patient Position - Orthostatic VS BP Location FiO2 (%)   10/31/23 2345 -- -- -- --   Oral          Pain Score       11/01/23 0026       10 - Worst Possible Pain             Orthostatic Vital Signs  Vitals:    10/31/23 2344   BP: 166/85   Pulse: 88       Physical Exam  Constitutional:       General: He is not in acute distress. Appearance: Normal appearance. He is not ill-appearing, toxic-appearing or diaphoretic. HENT:      Head: Normocephalic and atraumatic. Nose: Nose normal.      Mouth/Throat:      Mouth: Mucous membranes are moist.      Pharynx: Oropharynx is clear. Eyes:      Conjunctiva/sclera: Conjunctivae normal.      Pupils: Pupils are equal, round, and reactive to light. Cardiovascular:      Rate and Rhythm: Normal rate and regular rhythm. Pulses: Normal pulses. Heart sounds: Normal heart sounds. No murmur heard. No friction rub. No gallop. Pulmonary:      Effort: Pulmonary effort is normal.      Breath sounds: Normal breath sounds. No wheezing, rhonchi or rales. Chest:      Chest wall: No tenderness. Abdominal:      General: Abdomen is flat. Palpations: Abdomen is soft. Tenderness: There is no abdominal tenderness. There is no guarding or rebound. Musculoskeletal:         General: No swelling or tenderness. Normal range of motion. Cervical back: Normal range of motion and neck supple. No rigidity or tenderness. Right lower leg: No edema. Left lower leg: No edema. Lymphadenopathy:      Cervical: No cervical adenopathy. Skin:     General: Skin is warm and dry. Capillary Refill: Capillary refill takes less than 2 seconds. Coloration: Skin is not jaundiced or pale. Findings: No bruising, erythema, lesion or rash. Neurological:      General: No focal deficit present. Mental Status: He is alert and oriented to person, place, and time.          ED Medications  Medications   ketorolac (TORADOL) injection 15 mg (15 mg Intravenous Given 11/1/23 0026)       Diagnostic Studies  Results Reviewed       Procedure Component Value Units Date/Time    HS Troponin 0hr (reflex protocol) [[id number]]  (Normal) Collected: 11/01/23 0023    Lab Status: Final result Specimen: Blood from Arm, Right Updated: 11/01/23 0225     hs TnI 0hr 4 ng/L     Comprehensive metabolic panel [[id number]]  (Abnormal) Collected: 11/01/23 0023    Lab Status: Final result Specimen: Blood from Arm, Right Updated: 11/01/23 0225     Sodium 134 mmol/L      Potassium 4.2 mmol/L      Chloride 100 mmol/L      CO2 28 mmol/L      ANION GAP 6 mmol/L      BUN 16 mg/dL      Creatinine 0.86 mg/dL      Glucose 97 mg/dL      Calcium 9.4 mg/dL      AST 17 U/L      ALT 20 U/L      Alkaline Phosphatase 70 U/L      Total Protein 7.9 g/dL      Albumin 4.4 g/dL      Total Bilirubin 0.40 mg/dL      eGFR 103 ml/min/1.73sq m     Narrative:      [facility] guidelines for Chronic Kidney Disease (CKD):     Stage 1 with normal or high GFR (GFR > 90 mL/min/1.73 square meters)    Stage 2 Mild CKD (GFR = 60-89 mL/min/1.73 square meters)    Stage 3A Moderate CKD (GFR = 45-59 mL/min/1.73 square meters)    Stage 3B Moderate CKD (GFR = 30-44 mL/min/1.73 square meters)    Stage 4 Severe CKD (GFR = 15-29 mL/min/1.73 square meters)    Stage 5 End Stage CKD (GFR <15 mL/min/1.73 square meters)  Note: GFR calculation is accurate only with a steady state creatinine    CBC and differential [[id number]]  (Abnormal) Collected: 11/01/23 0023    Lab Status: Final result Specimen: Blood from Arm, Right Updated: 11/01/23 0032     WBC 12.67 Thousand/uL      RBC 6.84 Million/uL      Hemoglobin 15.6 g/dL      Hematocrit 50.7 %      MCV 74 fL      MCH 22.8 pg      MCHC 30.8 g/dL      RDW 15.1 %      MPV 11.2 fL      Platelets 189 Thousands/uL nRBC 0 /100 WBCs      Neutrophils Relative 60 %      Immat GRANS % 1 %      Lymphocytes Relative 27 %      Monocytes Relative 8 %      Eosinophils Relative 3 %      Basophils Relative 1 %      Neutrophils Absolute 7.74 Thousands/µL      Immature Grans Absolute 0.12 Thousand/uL      Lymphocytes Absolute 3.44 Thousands/µL      Monocytes Absolute 0.98 Thousand/µL      Eosinophils Absolute 0.32 Thousand/µL      Basophils Absolute 0.07 Thousands/µL                    XR chest 2 views    (Results Pending)         Procedures  Procedures      ED Course             HEART Risk Score      Flowsheet Row Most Recent Value   Heart Score Risk Calculator    History 0 Filed at: 11/01/2023 0100   ECG 0 Filed at: 11/01/2023 0100   Age 1 Filed at: 11/01/2023 0100   Risk Factors 1 Filed at: 11/01/2023 0100   Troponin 0 Filed at: 11/01/2023 0100   HEART Score 2 Filed at: 11/01/2023 0100                        SBIRT 22yo+      Flowsheet Row Most Recent Value   Initial Alcohol Screen: US AUDIT-C     1. How often do you have a drink containing alcohol? 1 Filed at: 10/31/2023 2346   2. How many drinks containing alcohol do you have on a typical day you are drinking? 0 Filed at: 10/31/2023 2346   3a. Male UNDER 65: How often do you have five or more drinks on one occasion? 1 Filed at: 10/31/2023 2346   Audit-C Score 2 Filed at: 10/31/2023 2346   JUSTIN: How many times in the past year have you. .. Used an illegal drug or used a prescription medication for non-medical reasons? Never Filed at: 10/31/2023 2346                  Medical Decision Making  64-year-old male with no significant past medical history who presents complaining of right-sided chest pain and cough for the past 2 days. Vitals are within the normal limits.   On exam the patient is in no acute distress, mucous membranes moist, heart is regular rate and rhythm, lungs are clear to auscultation bilaterally, no chest wall tenderness, abdomen is soft and nontender, no lower extremity edema. Differential diagnosis includes musculoskeletal chest pain, pneumonia, pneumothorax, arrhythmia, and ACS. Will order EKG, troponin, CBC, CMP, and chest x-ray. We will treat the patient's pain with Toradol. EKG rate 78, sinus rhythm, normal axis, normal intervals, no ST elevation or depression, similar to prior. Troponin is 4. Given the patient's duration of symptoms repeat troponin is not required. Patient has a mild leukocytosis with a WBC count of 12.67. The remainder the patient's lab work is reviewed and without actionable derangements. Chest x-ray shows no acute cardiopulmonary disease on my interpretation. The patient reports improvement in his symptoms after Toradol. The patient's symptom are most likely secondary to musculoskeletal chest pain. A prescription for ibuprofen is sent to the patient's pharmacy. The patient is instructed to follow-up with his PCP. Return precautions are given and the patient is discharged. Amount and/or Complexity of Data Reviewed  Labs: ordered. Radiology: ordered. Risk  Prescription drug management. Disposition  Final diagnoses:   Chest wall pain     Time reflects when diagnosis was documented in both MDM as applicable and the Disposition within this note       Time User Action Codes Description Comment    11/1/2023  3:18 AM Collette Lockwood Add [R07.89] Chest wall pain           ED Disposition       ED Disposition   Discharge    Condition   Stable    Date/Time   Wed Nov 1, 2023 [address] discharge to home/self care.                    Follow-up Information       Follow up With Specialties Details Why Contact Info Additional [address] [facility] Emergency Department Emergency Medicine Go to  If symptoms worsen [address]  [id number]  [facility] Emergency Department, [address] [place], [place], [address] 200 [facility] Internal Medicine Schedule an appointment as soon as possible for a visit   [facility] [address] [address] [address]  [address] [facility], [address] [place] [address], [place], [place], [address] [phone]            Discharge Medication List as of 11/1/2023  3:21 AM        START taking these medications    Details   !! ibuprofen (MOTRIN) 600 mg tablet Take 1 tablet (600 mg total) by mouth every 6 (six) hours as needed for mild pain, Starting Wed 11/1/2023, Normal       !! - Potential duplicate medications found. Please discuss with provider.         CONTINUE these medications which have NOT CHANGED    Details   cyclobenzaprine (FLEXERIL) 10 mg tablet Take 10 mg by mouth daily as needed, Starting Tue 12/17/2019, Historical Med      dextromethorphan-guaifenesin (MUCINEX DM)  MG per 12 hr tablet Take 1 tablet by mouth every 12 (twelve) hours, Starting Tue 9/19/2017, Print      fluticasone (FLONASE) 50 mcg/act nasal spray 1 spray into each nostril daily, Starting Tue 9/19/2017, Print      !! ibuprofen (MOTRIN) 400 mg tablet Take 1 tablet (400 mg total) by mouth every 6 (six) hours as needed for mild pain, Starting Mon 1/14/2019, Print      !! ibuprofen (MOTRIN) 600 mg tablet Take 1 tablet (600 mg total) by mouth every 8 (eight) hours as needed for mild pain or moderate pain, Starting Mon 12/30/2019, Print      !! ibuprofen (MOTRIN) 600 mg tablet Take 1 tablet (600 mg total) by mouth every 6 (six) hours as needed for mild pain, Starting Fri 1/24/2020, Print      loratadine (CLARITIN) 10 mg tablet Take 1 tablet (10 mg total) by mouth daily, Starting Fri 1/24/2020, Print      nicotine (NICODERM CQ) 14 mg/24hr TD 24 hr patch Place 1 patch on the skin every 24 hours, Starting Tue 12/17/2019, Historical Med      nicotine polacrilex (NICORETTE) 4 mg gum Chew 4 mg 4 (four) times a day as needed, Starting Tue 12/17/2019, Historical Med      oxymetazoline (AFRIN) 0.05 % nasal spray 2 sprays by Each Nare route 2 (two) times a day For 3 days only to avoid rebound congestion. , Starting Tue 1/14/2020, Print       !! - Potential duplicate medications found. Please discuss with provider. No discharge procedures on file. PDMP Review       None             ED Provider  Attending physically available and evaluated Magdiel Palomo. I managed the patient along with the ED Attending.     Electronically Signed by           Jessica Varela DO  11/01/23 0732

## 2025-01-22 ENCOUNTER — HOSPITAL ENCOUNTER (EMERGENCY)
Facility: HOSPITAL | Age: 50
Discharge: HOME | End: 2025-01-22
Attending: EMERGENCY MEDICINE

## 2025-01-22 VITALS
OXYGEN SATURATION: 97 % | TEMPERATURE: 97.3 F | WEIGHT: 285 LBS | HEART RATE: 117 BPM | RESPIRATION RATE: 20 BRPM | SYSTOLIC BLOOD PRESSURE: 120 MMHG | BODY MASS INDEX: 39.9 KG/M2 | DIASTOLIC BLOOD PRESSURE: 106 MMHG | HEIGHT: 71 IN

## 2025-01-22 DIAGNOSIS — J06.9 URI (UPPER RESPIRATORY INFECTION): Primary | ICD-10-CM

## 2025-01-22 PROCEDURE — 99282 EMERGENCY DEPT VISIT SF MDM: CPT

## 2025-01-22 PROCEDURE — 99283 EMERGENCY DEPT VISIT LOW MDM: CPT | Performed by: EMERGENCY MEDICINE

## 2025-01-22 RX ORDER — IBUPROFEN 400 MG/1
400 TABLET, FILM COATED ORAL ONCE
Status: COMPLETED | OUTPATIENT
Start: 2025-01-22 | End: 2025-01-22

## 2025-01-22 RX ORDER — ACETAMINOPHEN 325 MG/1
975 TABLET ORAL ONCE
Status: COMPLETED | OUTPATIENT
Start: 2025-01-22 | End: 2025-01-22

## 2025-01-22 RX ADMIN — IBUPROFEN 400 MG: 400 TABLET, FILM COATED ORAL at 19:55

## 2025-01-22 RX ADMIN — ACETAMINOPHEN 975 MG: 325 TABLET, FILM COATED ORAL at 19:55

## 2025-01-22 NOTE — Clinical Note
Pavan Abdullahi was seen and treated in our emergency department on 1/22/2025.                Diagnosis:     Pavan  may return to work on return date.    He may return on this date: 01/27/2025         If you have any questions or concerns, please don't hesitate to call.      Smiley Soler MD    ______________________________           _______________          _______________  Hospital Representative                              Date                                Time

## 2025-01-23 NOTE — DISCHARGE INSTRUCTIONS
You were seen in the Emergency Department today for upper respiratory infection.    Please follow up with your primary care doctor.  Please return to the Emergency Department if you experience worsening of your current symptoms or any other concerning symptoms.

## 2025-01-23 NOTE — ED ATTENDING ATTESTATION
1/22/2025  I, Ilya Olson MD, saw and evaluated the patient. I have discussed the patient with the resident/non-physician practitioner and agree with the resident's/non-physician practitioner's findings, Plan of Care, and MDM as documented in the resident's/non-physician practitioner's note, except where noted. All available labs and Radiology studies were reviewed.  I was present for key portions of any procedure(s) performed by the resident/non-physician practitioner and I was immediately available to provide assistance.       At this point I agree with the current assessment done in the Emergency Department.  I have conducted an independent evaluation of this patient a history and physical is as follows:    ED Course  ED Course as of 01/22/25 2000 Wed Jan 22, 2025 1948 Per resident h&p 48 YO M presents for cough, myalgias chills O: NL exam I/P cough medicine and work note     HPI: Patient is a 49 y.o. male who presents with 2 days of cough, fatigue, and myalgias which the patient describes at mild The patient has had contact with people with similar symptoms.  The patient has not taken any medication.    No Known Allergies    No past medical history on file.   No past surgical history on file.  Social History     Tobacco Use    Smoking status: Some Days     Types: Cigarettes    Smokeless tobacco: Never    Tobacco comments:     on weekends   Vaping Use    Vaping status: Never Used   Substance Use Topics    Alcohol use: Not Currently    Drug use: Not Currently     Types: Marijuana       Nursing notes reviewed  Physical Exam:  ED Triage Vitals   Temperature Pulse Respirations Blood Pressure SpO2   01/22/25 1846 01/22/25 1844 01/22/25 1844 01/22/25 1844 01/22/25 1844   (!) 97.3 °F (36.3 °C) (!) 117 20 (!) 120/106 97 %      Temp Source Heart Rate Source Patient Position - Orthostatic VS BP Location FiO2 (%)   01/22/25 1846 -- 01/22/25 1844 01/22/25 1844 --   Oral  Sitting Left arm       Pain Score        01/22/25 1844       7           ROS: Positive for chills, nasal discharge the remainder of a 10 organ system ROS was otherwise unremarkable.  General: awake, alert, no acute distress    Head: normocephalic, atraumatic    Eyes: no scleral icterus  Ears: external ears normal, hearing grossly intact  Nose: external exam grossly normal, positive nasal discharge  Neck: symmetric, No JVD noted, trachea midline  Pulmonary: no respiratory distress, no tachypnea noted  Cardiovascular: appears well perfused  Abdomen: no distention noted  Musculoskeletal: no deformities noted, tone normal  Neuro: grossly non-focal  Psych: mood and affect appropriate    The patient is stable and has a history and physical exam consistent with a viral illness. COVID19 testing has not been performed.  I considered the patient's other medical conditions as applicable/noted above in my medical decision making.  The patient is stable upon discharge. The plan is for supportive care at home.    The patient (and any family present) verbalized understanding of the discharge instructions and warnings that would necessitate return to the Emergency Department.  All questions were answered prior to discharge.    Medications   acetaminophen (TYLENOL) tablet 975 mg (975 mg Oral Given 1/22/25 1955)   ibuprofen (MOTRIN) tablet 400 mg (400 mg Oral Given 1/22/25 1955)     Final diagnoses:   URI (upper respiratory infection)     Time reflects when diagnosis was documented in both MDM as applicable and the Disposition within this note       Time User Action Codes Description Comment    1/22/2025  7:51 PM Smiley Soler Add [J06.9] URI (upper respiratory infection)           ED Disposition       ED Disposition   Discharge    Condition   Stable    Date/Time   Wed Jan 22, 2025  7:54 PM    Comment   Pavan Abdullahi discharge to home/self care.                   Follow-up Information       Follow up With Specialties Details Why Contact Info    Shun Lopez MD  Family Medicine   [facility] [address]  [facility] [address]  [phone]            Patient's Medications   Discharge Prescriptions    No medications on file     No discharge procedures on file.    Electronically Signed by      Critical Care Time  Procedures

## 2025-01-23 NOTE — ED PROVIDER NOTES
Time reflects when diagnosis was documented in both MDM as applicable and the Disposition within this note       Time User Action Codes Description Comment    1/22/2025  7:51 PM Smiley Soler Add [J06.9] URI (upper respiratory infection)           ED Disposition       ED Disposition   Discharge    Condition   Stable    Date/Time   Wed Jan 22, 2025  7:54 PM    Comment   Pavan Abdullahi discharge to home/self care.                   Assessment & Plan       Medical Decision Making  Patient presents for evaluation of cough, congestion, myalgias, fevers.  Symptoms likely in setting of viral illness. Will give Tylenol and Motrin.  He was told to follow-up with PCP.  He was given return precautions and discharged in stable condition.    Risk  OTC drugs.  Prescription drug management.             Medications   acetaminophen (TYLENOL) tablet 975 mg (975 mg Oral Given 1/22/25 1955)   ibuprofen (MOTRIN) tablet 400 mg (400 mg Oral Given 1/22/25 1955)       ED Risk Strat Scores                          SBIRT 22yo+      Flowsheet Row Most Recent Value   Initial Alcohol Screen: US AUDIT-C     1. How often do you have a drink containing alcohol? 0 Filed at: 01/22/2025 1846   2. How many drinks containing alcohol do you have on a typical day you are drinking?  0 Filed at: 01/22/2025 1846   3a. Male UNDER 65: How often do you have five or more drinks on one occasion? 0 Filed at: 01/22/2025 1846   3b. FEMALE Any Age, or MALE 65+: How often do you have 4 or more drinks on one occassion? 0 Filed at: 01/22/2025 1846   Audit-C Score 0 Filed at: 01/22/2025 1846   JUSTIN: How many times in the past year have you...    Used an illegal drug or used a prescription medication for non-medical reasons? Never Filed at: 01/22/2025 1846                            History of Present Illness       Chief Complaint   Patient presents with    URI     Pt c/o cough and body aches that started 2 days ago.        No past medical history on file.   No past  surgical history on file.   No family history on file.   Social History     Tobacco Use    Smoking status: Some Days     Types: Cigarettes    Smokeless tobacco: Never    Tobacco comments:     on weekends   Vaping Use    Vaping status: Never Used   Substance Use Topics    Alcohol use: Not Currently    Drug use: Not Currently     Types: Marijuana      E-Cigarette/Vaping    E-Cigarette Use Never User       E-Cigarette/Vaping Substances      I have reviewed and agree with the history as documented.     HPI    Patient is a 49-year-old male with no significant history who presents with fever, cough, congestion, myalgias.  Patient states that symptoms developed about 2 days ago.  He has not taken his temperature but has subjective fevers.  Somebody that lives in the same house as him just tested positive for the flu recently.  He has been coughing so much his head and abdomen are hurting.  He feels sinus pressure.  He has not taken anything for the pain.  He is requesting something for his cough and a note for work.    Review of Systems   Constitutional:  Positive for chills and fever.   HENT:  Positive for congestion and rhinorrhea.    Respiratory:  Positive for cough. Negative for shortness of breath.    Gastrointestinal:  Negative for nausea and vomiting.   Genitourinary:  Negative for dysuria and hematuria.   Musculoskeletal:  Positive for myalgias. Negative for neck pain.   All other systems reviewed and are negative.          Objective       ED Triage Vitals   Temperature Pulse Blood Pressure Respirations SpO2 Patient Position - Orthostatic VS   01/22/25 1846 01/22/25 1844 01/22/25 1844 01/22/25 1844 01/22/25 1844 01/22/25 1844   (!) 97.3 °F (36.3 °C) (!) 117 (!) 120/106 20 97 % Sitting      Temp Source Heart Rate Source BP Location FiO2 (%) Pain Score    01/22/25 1846 -- 01/22/25 1844 -- 01/22/25 1844    Oral  Left arm  7      Vitals      Date and Time Temp Pulse SpO2 Resp BP Pain Score FACES Pain Rating User    25 -- -- -- -- -- 7 -- MS   25 97.3 °F (36.3 °C) -- -- -- -- -- -- BG   25 -- 117 97 % 20 120/106 7 -- BG            Physical Exam  Vitals and nursing note reviewed.   HENT:      Head: Normocephalic and atraumatic.      Nose: Congestion and rhinorrhea present.      Mouth/Throat:      Mouth: Mucous membranes are moist.   Eyes:      General:         Right eye: No discharge.         Left eye: No discharge.      Extraocular Movements: Extraocular movements intact.   Cardiovascular:      Rate and Rhythm: Normal rate and regular rhythm.   Pulmonary:      Effort: Pulmonary effort is normal. No respiratory distress.      Breath sounds: Normal breath sounds. No wheezing or rhonchi.   Abdominal:      Palpations: Abdomen is soft.      Tenderness: There is no abdominal tenderness.   Musculoskeletal:      Cervical back: Normal range of motion.      Right lower leg: No edema.      Left lower leg: No edema.   Skin:     General: Skin is warm and dry.      Capillary Refill: Capillary refill takes less than 2 seconds.   Neurological:      Mental Status: He is alert.   Psychiatric:         Mood and Affect: Mood normal.         Results Reviewed       None            No orders to display       Procedures    ED Medication and Procedure Management   Prior to Admission Medications   Prescriptions Last Dose Informant Patient Reported? Taking?   cyclobenzaprine (FLEXERIL) 10 mg tablet   Yes No   Sig: Take 10 mg by mouth daily as needed   dextromethorphan-guaifenesin (MUCINEX DM)  MG per 12 hr tablet   No No   Sig: Take 1 tablet by mouth every 12 (twelve) hours   Patient not taking: Reported on 2020   fluticasone (FLONASE) 50 mcg/act nasal spray   No No   Si spray into each nostril daily   Patient not taking: Reported on 2020   ibuprofen (MOTRIN) 400 mg tablet   No No   Sig: Take 1 tablet (400 mg total) by mouth every 6 (six) hours as needed for mild pain   Patient not taking: Reported on  2020   ibuprofen (MOTRIN) 600 mg tablet   No No   Sig: Take 1 tablet (600 mg total) by mouth every 8 (eight) hours as needed for mild pain or moderate pain   Patient not taking: Reported on 2020   ibuprofen (MOTRIN) 600 mg tablet   No No   Sig: Take 1 tablet (600 mg total) by mouth every 6 (six) hours as needed for mild pain   Patient not taking: Reported on 2020   ibuprofen (MOTRIN) 600 mg tablet   No No   Sig: Take 1 tablet (600 mg total) by mouth every 6 (six) hours as needed for mild pain   loratadine (CLARITIN) 10 mg tablet   No No   Sig: Take 1 tablet (10 mg total) by mouth daily   Patient not taking: Reported on 2020   nicotine (NICODERM CQ) 14 mg/24hr TD 24 hr patch   Yes No   Sig: Place 1 patch on the skin every 24 hours   nicotine polacrilex (NICORETTE) 4 mg gum   Yes No   Sig: Chew 4 mg 4 (four) times a day as needed   oxymetazoline (AFRIN) 0.05 % nasal spray   No No   Si sprays by Each Nare route 2 (two) times a day For 3 days only to avoid rebound congestion.   Patient not taking: Reported on 2020      Facility-Administered Medications: None     Discharge Medication List as of 2025  7:54 PM        CONTINUE these medications which have NOT CHANGED    Details   cyclobenzaprine (FLEXERIL) 10 mg tablet Take 10 mg by mouth daily as needed, Starting 2019, Historical Med      dextromethorphan-guaifenesin (MUCINEX DM)  MG per 12 hr tablet Take 1 tablet by mouth every 12 (twelve) hours, Starting 2017, Print      fluticasone (FLONASE) 50 mcg/act nasal spray 1 spray into each nostril daily, Starting 2017, Print      !! ibuprofen (MOTRIN) 400 mg tablet Take 1 tablet (400 mg total) by mouth every 6 (six) hours as needed for mild pain, Starting 2019, Print      !! ibuprofen (MOTRIN) 600 mg tablet Take 1 tablet (600 mg total) by mouth every 8 (eight) hours as needed for mild pain or moderate pain, Starting 2019, Print      !!  ibuprofen (MOTRIN) 600 mg tablet Take 1 tablet (600 mg total) by mouth every 6 (six) hours as needed for mild pain, Starting Fri 1/24/2020, Print      !! ibuprofen (MOTRIN) 600 mg tablet Take 1 tablet (600 mg total) by mouth every 6 (six) hours as needed for mild pain, Starting Wed 11/1/2023, Normal      loratadine (CLARITIN) 10 mg tablet Take 1 tablet (10 mg total) by mouth daily, Starting Fri 1/24/2020, Print      nicotine (NICODERM CQ) 14 mg/24hr TD 24 hr patch Place 1 patch on the skin every 24 hours, Starting Tue 12/17/2019, Historical Med      nicotine polacrilex (NICORETTE) 4 mg gum Chew 4 mg 4 (four) times a day as needed, Starting Tue 12/17/2019, Historical Med      oxymetazoline (AFRIN) 0.05 % nasal spray 2 sprays by Each Nare route 2 (two) times a day For 3 days only to avoid rebound congestion., Starting Tue 1/14/2020, Print       !! - Potential duplicate medications found. Please discuss with provider.        No discharge procedures on file.  ED SEPSIS DOCUMENTATION   Time reflects when diagnosis was documented in both MDM as applicable and the Disposition within this note       Time User Action Codes Description Comment    1/22/2025  7:51 PM Smiley Soler Add [J06.9] URI (upper respiratory infection)                  Smiley Soler MD  01/25/25 4742